# Patient Record
Sex: MALE | Race: OTHER | NOT HISPANIC OR LATINO
[De-identification: names, ages, dates, MRNs, and addresses within clinical notes are randomized per-mention and may not be internally consistent; named-entity substitution may affect disease eponyms.]

---

## 2017-03-03 ENCOUNTER — APPOINTMENT (OUTPATIENT)
Dept: INTERNAL MEDICINE | Facility: CLINIC | Age: 62
End: 2017-03-03

## 2017-03-07 ENCOUNTER — APPOINTMENT (OUTPATIENT)
Dept: OPHTHALMOLOGY | Facility: CLINIC | Age: 62
End: 2017-03-07

## 2017-03-07 DIAGNOSIS — H11.153 PINGUECULA, BILATERAL: ICD-10-CM

## 2017-03-07 DIAGNOSIS — H26.9 UNSPECIFIED CATARACT: ICD-10-CM

## 2017-03-10 ENCOUNTER — APPOINTMENT (OUTPATIENT)
Dept: INTERNAL MEDICINE | Facility: CLINIC | Age: 62
End: 2017-03-10

## 2017-03-17 ENCOUNTER — LABORATORY RESULT (OUTPATIENT)
Age: 62
End: 2017-03-17

## 2017-03-17 ENCOUNTER — OUTPATIENT (OUTPATIENT)
Dept: OUTPATIENT SERVICES | Facility: HOSPITAL | Age: 62
LOS: 1 days | End: 2017-03-17
Payer: COMMERCIAL

## 2017-03-17 ENCOUNTER — APPOINTMENT (OUTPATIENT)
Dept: INTERNAL MEDICINE | Facility: CLINIC | Age: 62
End: 2017-03-17

## 2017-03-17 VITALS
BODY MASS INDEX: 23.73 KG/M2 | HEIGHT: 64 IN | WEIGHT: 139 LBS | HEART RATE: 60 BPM | DIASTOLIC BLOOD PRESSURE: 60 MMHG | SYSTOLIC BLOOD PRESSURE: 111 MMHG

## 2017-03-17 DIAGNOSIS — I10 ESSENTIAL (PRIMARY) HYPERTENSION: ICD-10-CM

## 2017-03-17 PROCEDURE — 82043 UR ALBUMIN QUANTITATIVE: CPT

## 2017-03-17 PROCEDURE — 80053 COMPREHEN METABOLIC PANEL: CPT

## 2017-03-17 PROCEDURE — 80061 LIPID PANEL: CPT

## 2017-03-17 PROCEDURE — 83036 HEMOGLOBIN GLYCOSYLATED A1C: CPT

## 2017-03-17 PROCEDURE — G0463: CPT

## 2017-03-18 LAB
ALBUMIN SERPL ELPH-MCNC: 4.7 G/DL — SIGNIFICANT CHANGE UP (ref 3.3–5)
ALP SERPL-CCNC: 58 U/L — SIGNIFICANT CHANGE UP (ref 40–120)
ALT FLD-CCNC: 49 U/L — HIGH (ref 10–45)
ANION GAP SERPL CALC-SCNC: 14 MMOL/L — SIGNIFICANT CHANGE UP (ref 5–17)
AST SERPL-CCNC: 32 U/L — SIGNIFICANT CHANGE UP (ref 10–40)
BILIRUB SERPL-MCNC: 1.6 MG/DL — HIGH (ref 0.2–1.2)
BUN SERPL-MCNC: 13 MG/DL — SIGNIFICANT CHANGE UP (ref 7–23)
CALCIUM SERPL-MCNC: 9.9 MG/DL — SIGNIFICANT CHANGE UP (ref 8.4–10.5)
CHLORIDE SERPL-SCNC: 100 MMOL/L — SIGNIFICANT CHANGE UP (ref 96–108)
CHOLEST SERPL-MCNC: 214 MG/DL — HIGH (ref 10–199)
CO2 SERPL-SCNC: 25 MMOL/L — SIGNIFICANT CHANGE UP (ref 22–31)
CREAT ?TM UR-MCNC: 175 MG/DL — SIGNIFICANT CHANGE UP
CREAT SERPL-MCNC: 0.95 MG/DL — SIGNIFICANT CHANGE UP (ref 0.5–1.3)
GLUCOSE SERPL-MCNC: 150 MG/DL — HIGH (ref 70–99)
HBA1C BLD-MCNC: 7.9 % — HIGH (ref 4–5.6)
HDLC SERPL-MCNC: 47 MG/DL — SIGNIFICANT CHANGE UP (ref 40–125)
LIPID PNL WITH DIRECT LDL SERPL: 126 MG/DL — SIGNIFICANT CHANGE UP
MICROALBUMIN UR-MCNC: 0.6 MG/DL — SIGNIFICANT CHANGE UP
MICROALBUMIN/CREAT UR-RTO: 3 UG/MG — SIGNIFICANT CHANGE UP (ref 0–30)
POTASSIUM SERPL-MCNC: 4.9 MMOL/L — SIGNIFICANT CHANGE UP (ref 3.5–5.3)
POTASSIUM SERPL-SCNC: 4.9 MMOL/L — SIGNIFICANT CHANGE UP (ref 3.5–5.3)
PROT SERPL-MCNC: 7.5 G/DL — SIGNIFICANT CHANGE UP (ref 6–8.3)
SODIUM SERPL-SCNC: 139 MMOL/L — SIGNIFICANT CHANGE UP (ref 135–145)
TOTAL CHOLESTEROL/HDL RATIO MEASUREMENT: 4.6 RATIO — SIGNIFICANT CHANGE UP (ref 3.4–9.6)
TRIGL SERPL-MCNC: 203 MG/DL — HIGH (ref 10–149)

## 2017-03-20 DIAGNOSIS — E11.9 TYPE 2 DIABETES MELLITUS WITHOUT COMPLICATIONS: ICD-10-CM

## 2017-03-20 DIAGNOSIS — Z00.00 ENCOUNTER FOR GENERAL ADULT MEDICAL EXAMINATION WITHOUT ABNORMAL FINDINGS: ICD-10-CM

## 2017-03-20 DIAGNOSIS — E80.6 OTHER DISORDERS OF BILIRUBIN METABOLISM: ICD-10-CM

## 2017-04-27 PROBLEM — H11.153 PINGUECULA, BILATERAL: Status: RESOLVED | Noted: 2017-03-07 | Resolved: 2017-04-27

## 2017-04-27 PROBLEM — H26.9 CATARACTS, BILATERAL: Status: RESOLVED | Noted: 2017-03-07 | Resolved: 2017-04-27

## 2017-10-11 ENCOUNTER — APPOINTMENT (OUTPATIENT)
Dept: OPHTHALMOLOGY | Facility: CLINIC | Age: 62
End: 2017-10-11
Payer: MEDICAID

## 2017-10-11 PROCEDURE — 92012 INTRM OPH EXAM EST PATIENT: CPT

## 2017-10-20 ENCOUNTER — RESULT CHARGE (OUTPATIENT)
Age: 62
End: 2017-10-20

## 2017-10-20 ENCOUNTER — OUTPATIENT (OUTPATIENT)
Dept: OUTPATIENT SERVICES | Facility: HOSPITAL | Age: 62
LOS: 1 days | End: 2017-10-20
Payer: COMMERCIAL

## 2017-10-20 ENCOUNTER — APPOINTMENT (OUTPATIENT)
Dept: INTERNAL MEDICINE | Facility: CLINIC | Age: 62
End: 2017-10-20

## 2017-10-20 ENCOUNTER — LABORATORY RESULT (OUTPATIENT)
Age: 62
End: 2017-10-20

## 2017-10-20 VITALS
BODY MASS INDEX: 23.56 KG/M2 | HEIGHT: 64 IN | DIASTOLIC BLOOD PRESSURE: 80 MMHG | SYSTOLIC BLOOD PRESSURE: 100 MMHG | OXYGEN SATURATION: 98 % | WEIGHT: 138 LBS | HEART RATE: 60 BPM

## 2017-10-20 DIAGNOSIS — M25.519 PAIN IN UNSPECIFIED SHOULDER: ICD-10-CM

## 2017-10-20 DIAGNOSIS — I10 ESSENTIAL (PRIMARY) HYPERTENSION: ICD-10-CM

## 2017-10-20 LAB
ALBUMIN SERPL ELPH-MCNC: 4.8 G/DL — SIGNIFICANT CHANGE UP (ref 3.3–5)
ALP SERPL-CCNC: 59 U/L — SIGNIFICANT CHANGE UP (ref 40–120)
ALT FLD-CCNC: 49 U/L — HIGH (ref 10–45)
ANION GAP SERPL CALC-SCNC: 16 MMOL/L — SIGNIFICANT CHANGE UP (ref 5–17)
AST SERPL-CCNC: 28 U/L — SIGNIFICANT CHANGE UP (ref 10–40)
BILIRUB SERPL-MCNC: 1.4 MG/DL — HIGH (ref 0.2–1.2)
BUN SERPL-MCNC: 12 MG/DL — SIGNIFICANT CHANGE UP (ref 7–23)
CALCIUM SERPL-MCNC: 10.3 MG/DL — SIGNIFICANT CHANGE UP (ref 8.4–10.5)
CHLORIDE SERPL-SCNC: 98 MMOL/L — SIGNIFICANT CHANGE UP (ref 96–108)
CHOLEST SERPL-MCNC: 210 MG/DL — HIGH (ref 10–199)
CO2 SERPL-SCNC: 25 MMOL/L — SIGNIFICANT CHANGE UP (ref 22–31)
CREAT SERPL-MCNC: 0.98 MG/DL — SIGNIFICANT CHANGE UP (ref 0.5–1.3)
GLUCOSE SERPL-MCNC: 115 MG/DL — HIGH (ref 70–99)
HDLC SERPL-MCNC: 50 MG/DL — SIGNIFICANT CHANGE UP (ref 40–125)
LIPID PNL WITH DIRECT LDL SERPL: 120 MG/DL — SIGNIFICANT CHANGE UP
POTASSIUM SERPL-MCNC: 5.2 MMOL/L — SIGNIFICANT CHANGE UP (ref 3.5–5.3)
POTASSIUM SERPL-SCNC: 5.2 MMOL/L — SIGNIFICANT CHANGE UP (ref 3.5–5.3)
PROT SERPL-MCNC: 7.8 G/DL — SIGNIFICANT CHANGE UP (ref 6–8.3)
SODIUM SERPL-SCNC: 139 MMOL/L — SIGNIFICANT CHANGE UP (ref 135–145)
T4 FREE SERPL-MCNC: 1.4 NG/DL — SIGNIFICANT CHANGE UP (ref 0.9–1.8)
TOTAL CHOLESTEROL/HDL RATIO MEASUREMENT: 4.2 RATIO — SIGNIFICANT CHANGE UP (ref 3.4–9.6)
TRIGL SERPL-MCNC: 198 MG/DL — HIGH (ref 10–149)
TSH SERPL-MCNC: 2.08 UIU/ML — SIGNIFICANT CHANGE UP (ref 0.27–4.2)

## 2017-10-20 PROCEDURE — 84439 ASSAY OF FREE THYROXINE: CPT

## 2017-10-20 PROCEDURE — 84443 ASSAY THYROID STIM HORMONE: CPT

## 2017-10-20 PROCEDURE — 90688 IIV4 VACCINE SPLT 0.5 ML IM: CPT

## 2017-10-20 PROCEDURE — 83036 HEMOGLOBIN GLYCOSYLATED A1C: CPT

## 2017-10-20 PROCEDURE — 80053 COMPREHEN METABOLIC PANEL: CPT

## 2017-10-20 PROCEDURE — 80061 LIPID PANEL: CPT

## 2017-10-20 PROCEDURE — G0008: CPT

## 2017-10-20 PROCEDURE — G0463: CPT

## 2017-10-24 DIAGNOSIS — E80.6 OTHER DISORDERS OF BILIRUBIN METABOLISM: ICD-10-CM

## 2017-10-24 DIAGNOSIS — E78.5 HYPERLIPIDEMIA, UNSPECIFIED: ICD-10-CM

## 2017-10-24 DIAGNOSIS — Z23 ENCOUNTER FOR IMMUNIZATION: ICD-10-CM

## 2017-10-24 DIAGNOSIS — E11.9 TYPE 2 DIABETES MELLITUS WITHOUT COMPLICATIONS: ICD-10-CM

## 2017-10-27 LAB — HBA1C MFR BLD HPLC: 6.8

## 2018-05-04 ENCOUNTER — APPOINTMENT (OUTPATIENT)
Dept: INTERNAL MEDICINE | Facility: CLINIC | Age: 63
End: 2018-05-04
Payer: MEDICAID

## 2018-05-04 ENCOUNTER — OUTPATIENT (OUTPATIENT)
Dept: OUTPATIENT SERVICES | Facility: HOSPITAL | Age: 63
LOS: 1 days | End: 2018-05-04
Payer: COMMERCIAL

## 2018-05-04 ENCOUNTER — LABORATORY RESULT (OUTPATIENT)
Age: 63
End: 2018-05-04

## 2018-05-04 VITALS
SYSTOLIC BLOOD PRESSURE: 124 MMHG | WEIGHT: 135 LBS | HEIGHT: 64 IN | BODY MASS INDEX: 23.05 KG/M2 | DIASTOLIC BLOOD PRESSURE: 78 MMHG | OXYGEN SATURATION: 98 % | HEART RATE: 68 BPM

## 2018-05-04 DIAGNOSIS — I10 ESSENTIAL (PRIMARY) HYPERTENSION: ICD-10-CM

## 2018-05-04 LAB
ALBUMIN SERPL ELPH-MCNC: 4.5 G/DL — SIGNIFICANT CHANGE UP (ref 3.3–5)
ALBUMIN: 30
ALP SERPL-CCNC: 52 U/L — SIGNIFICANT CHANGE UP (ref 40–120)
ALT FLD-CCNC: 45 U/L — SIGNIFICANT CHANGE UP (ref 10–45)
ANION GAP SERPL CALC-SCNC: 14 MMOL/L — SIGNIFICANT CHANGE UP (ref 5–17)
AST SERPL-CCNC: 27 U/L — SIGNIFICANT CHANGE UP (ref 10–40)
BILIRUB DIRECT SERPL-MCNC: 0.3 MG/DL — HIGH (ref 0–0.2)
BILIRUB SERPL-MCNC: 1.4 MG/DL — HIGH (ref 0.2–1.2)
BUN SERPL-MCNC: 13 MG/DL — SIGNIFICANT CHANGE UP (ref 7–23)
CALCIUM SERPL-MCNC: 9.6 MG/DL — SIGNIFICANT CHANGE UP (ref 8.4–10.5)
CHLORIDE SERPL-SCNC: 104 MMOL/L — SIGNIFICANT CHANGE UP (ref 96–108)
CHOLEST SERPL-MCNC: 197 MG/DL — SIGNIFICANT CHANGE UP (ref 10–199)
CO2 SERPL-SCNC: 24 MMOL/L — SIGNIFICANT CHANGE UP (ref 22–31)
CREAT SERPL-MCNC: 1.04 MG/DL — SIGNIFICANT CHANGE UP (ref 0.5–1.3)
CREATININE: 300
GLUCOSE SERPL-MCNC: 132 MG/DL — HIGH (ref 70–99)
HBA1C BLD-MCNC: 8.2 % — HIGH (ref 4–5.6)
HBA1C MFR BLD HPLC: 8.1
HCT VFR BLD CALC: 47.4 % — SIGNIFICANT CHANGE UP (ref 39–50)
HDLC SERPL-MCNC: 45 MG/DL — SIGNIFICANT CHANGE UP (ref 40–125)
HGB BLD-MCNC: 15.8 G/DL — SIGNIFICANT CHANGE UP (ref 13–17)
LIPID PNL WITH DIRECT LDL SERPL: 113 MG/DL — SIGNIFICANT CHANGE UP
MCHC RBC-ENTMCNC: 29.8 PG — SIGNIFICANT CHANGE UP (ref 27–34)
MCHC RBC-ENTMCNC: 33.3 GM/DL — SIGNIFICANT CHANGE UP (ref 32–36)
MCV RBC AUTO: 89.3 FL — SIGNIFICANT CHANGE UP (ref 80–100)
MICROALBUMIN/CREAT UR TEST STR-RTO: <30
PLATELET # BLD AUTO: 231 K/UL — SIGNIFICANT CHANGE UP (ref 150–400)
POTASSIUM SERPL-MCNC: 4.9 MMOL/L — SIGNIFICANT CHANGE UP (ref 3.5–5.3)
POTASSIUM SERPL-SCNC: 4.9 MMOL/L — SIGNIFICANT CHANGE UP (ref 3.5–5.3)
PROT SERPL-MCNC: 7.3 G/DL — SIGNIFICANT CHANGE UP (ref 6–8.3)
RBC # BLD: 5.31 M/UL — SIGNIFICANT CHANGE UP (ref 4.2–5.8)
RBC # FLD: 13.5 % — SIGNIFICANT CHANGE UP (ref 10.3–14.5)
SODIUM SERPL-SCNC: 142 MMOL/L — SIGNIFICANT CHANGE UP (ref 135–145)
TOTAL CHOLESTEROL/HDL RATIO MEASUREMENT: 4.4 RATIO — SIGNIFICANT CHANGE UP (ref 3.4–9.6)
TRIGL SERPL-MCNC: 197 MG/DL — HIGH (ref 10–149)
WBC # BLD: 6.52 K/UL — SIGNIFICANT CHANGE UP (ref 3.8–10.5)
WBC # FLD AUTO: 6.52 K/UL — SIGNIFICANT CHANGE UP (ref 3.8–10.5)

## 2018-05-04 PROCEDURE — 83036 HEMOGLOBIN GLYCOSYLATED A1C: CPT

## 2018-05-04 PROCEDURE — 80053 COMPREHEN METABOLIC PANEL: CPT

## 2018-05-04 PROCEDURE — 82248 BILIRUBIN DIRECT: CPT

## 2018-05-04 PROCEDURE — 85027 COMPLETE CBC AUTOMATED: CPT

## 2018-05-04 PROCEDURE — 99213 OFFICE O/P EST LOW 20 MIN: CPT | Mod: GE

## 2018-05-04 PROCEDURE — G0463: CPT

## 2018-05-04 PROCEDURE — 80061 LIPID PANEL: CPT

## 2018-05-11 DIAGNOSIS — E80.6 OTHER DISORDERS OF BILIRUBIN METABOLISM: ICD-10-CM

## 2018-05-11 DIAGNOSIS — E78.5 HYPERLIPIDEMIA, UNSPECIFIED: ICD-10-CM

## 2018-05-11 DIAGNOSIS — Z12.11 ENCOUNTER FOR SCREENING FOR MALIGNANT NEOPLASM OF COLON: ICD-10-CM

## 2018-05-11 DIAGNOSIS — E11.9 TYPE 2 DIABETES MELLITUS WITHOUT COMPLICATIONS: ICD-10-CM

## 2019-01-04 ENCOUNTER — APPOINTMENT (OUTPATIENT)
Dept: OPHTHALMOLOGY | Facility: CLINIC | Age: 64
End: 2019-01-04
Payer: MEDICAID

## 2019-01-04 ENCOUNTER — APPOINTMENT (OUTPATIENT)
Dept: INTERNAL MEDICINE | Facility: CLINIC | Age: 64
End: 2019-01-04
Payer: MEDICAID

## 2019-01-04 ENCOUNTER — OUTPATIENT (OUTPATIENT)
Dept: OUTPATIENT SERVICES | Facility: HOSPITAL | Age: 64
LOS: 1 days | End: 2019-01-04
Payer: COMMERCIAL

## 2019-01-04 ENCOUNTER — RESULT REVIEW (OUTPATIENT)
Age: 64
End: 2019-01-04

## 2019-01-04 VITALS
OXYGEN SATURATION: 97 % | SYSTOLIC BLOOD PRESSURE: 110 MMHG | WEIGHT: 132 LBS | HEIGHT: 64 IN | DIASTOLIC BLOOD PRESSURE: 70 MMHG | BODY MASS INDEX: 22.53 KG/M2 | HEART RATE: 60 BPM

## 2019-01-04 DIAGNOSIS — I10 ESSENTIAL (PRIMARY) HYPERTENSION: ICD-10-CM

## 2019-01-04 LAB
BILIRUB UR QL STRIP: NORMAL
CLARITY UR: CLEAR
COLLECTION METHOD: NORMAL
GLUCOSE UR-MCNC: NORMAL
HBA1C MFR BLD HPLC: 6.5
HCG UR QL: 0.2 EU/DL
HGB UR QL STRIP.AUTO: NORMAL
KETONES UR-MCNC: NORMAL
LEUKOCYTE ESTERASE UR QL STRIP: NORMAL
NITRITE UR QL STRIP: NORMAL
PH UR STRIP: 5.5
PROT UR STRIP-MCNC: NORMAL
SP GR UR STRIP: 1.03

## 2019-01-04 PROCEDURE — 92012 INTRM OPH EXAM EST PATIENT: CPT

## 2019-01-04 PROCEDURE — 99213 OFFICE O/P EST LOW 20 MIN: CPT | Mod: GE

## 2019-01-04 NOTE — PHYSICAL EXAM
[No Acute Distress] : no acute distress [Well Nourished] : well nourished [Well-Appearing] : well-appearing [No JVD] : no jugular venous distention [Supple] : supple [Clear to Auscultation] : lungs were clear to auscultation bilaterally [Normal Rate] : normal rate  [Regular Rhythm] : with a regular rhythm [Normal S1, S2] : normal S1 and S2 [No Murmur] : no murmur heard [No Edema] : there was no peripheral edema [Soft] : abdomen soft [Non-distended] : non-distended [No HSM] : no HSM [Normal Bowel Sounds] : normal bowel sounds [No CVA Tenderness] : no CVA  tenderness [No Spinal Tenderness] : no spinal tenderness [de-identified] : no wheezes, rales or rhonchi  [de-identified] : LLQ pain worse with palpation. No evidence of peritoneal signs, no tenderness in Mcburney's area.  [de-identified] : No tenderness, warmth, fluctuance or asymmetry on palpation of the scrotum. No evidence of hernia on exam b/l.

## 2019-01-04 NOTE — HISTORY OF PRESENT ILLNESS
[de-identified] : 62 year old male with hx of T2DM, ED, HLD who presents for follow up. Patient was last seen last year found to have worsening A1c but was loss to follow up. \par \par Patient presents with reports of pain in the left lower abdominal pain/groin pain that started a couple of months. Comes on intermittently worsened when he works. Not worse with bearing down. Throbbing in nature 10/10 pain couple of seconds in nature and spontaneously. Denies any fevers, changes in bowel or urinary habits. Denies any hematuria, melena or BRBPR. Worse with palpation.

## 2019-01-04 NOTE — ASSESSMENT
[FreeTextEntry1] : 63 year old male with hx of T2DM, HLD who presents for left lower quadrants pain \par \par # LLQ pain \par - POCT UA: no hematuria and not significant for infection\par - could be subacute divertilculitis (no new diarrheal signs or infectious symptoms) or could be related to a kidney stone \par - will obtain CT A/P \par - instructed to hydrate appropriately, and use tylenol 975 BID PRN for pain \par - if patient develops worsening pain, diarrheal symptoms or fevers - - would return as suspicion for diverticulitis would be higher \par \par # DM\par - POCT A1c 6.5<-- 8.2 (much improved from prior visit) with metformin\par - c/w metformin as per usual regimen\par - will add on CMP to blood tests to assess kidney function\par \par # HCM\par - schedule for CPE \par - Flu shot administered at [facility] \par \par RTC for CPE \par \par Case discussed with Dr. Zamora

## 2019-01-04 NOTE — REVIEW OF SYSTEMS
[Abdominal Pain] : abdominal pain [Fever] : no fever [Chills] : no chills [Night Sweats] : no night sweats [Chest Pain] : no chest pain [Palpitations] : no palpitations [Shortness Of Breath] : no shortness of breath [Nausea] : no nausea [Constipation] : no constipation [Diarrhea] : no diarrhea [Vomiting] : no vomiting [Heartburn] : no heartburn [Melena] : no melena [Dysuria] : no dysuria [Incontinence] : no incontinence [Hesitancy] : no hesitancy [Hematuria] : no hematuria [Frequency] : no frequency [Back Pain] : no back pain

## 2019-01-07 ENCOUNTER — LABORATORY RESULT (OUTPATIENT)
Age: 64
End: 2019-01-07

## 2019-01-07 LAB
BASOPHILS # BLD AUTO: 0.01 K/UL — SIGNIFICANT CHANGE UP (ref 0–0.2)
BASOPHILS NFR BLD AUTO: 0.2 % — SIGNIFICANT CHANGE UP (ref 0–2)
EOSINOPHIL # BLD AUTO: 0.18 K/UL — SIGNIFICANT CHANGE UP (ref 0–0.5)
EOSINOPHIL NFR BLD AUTO: 3 % — SIGNIFICANT CHANGE UP (ref 0–6)
HCT VFR BLD CALC: 48.7 % — SIGNIFICANT CHANGE UP (ref 39–50)
HGB BLD-MCNC: 16.4 G/DL — SIGNIFICANT CHANGE UP (ref 13–17)
IMM GRANULOCYTES NFR BLD AUTO: 0.3 % — SIGNIFICANT CHANGE UP (ref 0–1.5)
LYMPHOCYTES # BLD AUTO: 2.09 K/UL — SIGNIFICANT CHANGE UP (ref 1–3.3)
LYMPHOCYTES # BLD AUTO: 34.9 % — SIGNIFICANT CHANGE UP (ref 13–44)
MCHC RBC-ENTMCNC: 29.5 PG — SIGNIFICANT CHANGE UP (ref 27–34)
MCHC RBC-ENTMCNC: 33.7 GM/DL — SIGNIFICANT CHANGE UP (ref 32–36)
MCV RBC AUTO: 87.6 FL — SIGNIFICANT CHANGE UP (ref 80–100)
MONOCYTES # BLD AUTO: 0.77 K/UL — SIGNIFICANT CHANGE UP (ref 0–0.9)
MONOCYTES NFR BLD AUTO: 12.9 % — SIGNIFICANT CHANGE UP (ref 2–14)
NEUTROPHILS # BLD AUTO: 2.92 K/UL — SIGNIFICANT CHANGE UP (ref 1.8–7.4)
NEUTROPHILS NFR BLD AUTO: 48.7 % — SIGNIFICANT CHANGE UP (ref 43–77)
PLATELET # BLD AUTO: 219 K/UL — SIGNIFICANT CHANGE UP (ref 150–400)
RBC # BLD: 5.56 M/UL — SIGNIFICANT CHANGE UP (ref 4.2–5.8)
RBC # FLD: 13.4 % — SIGNIFICANT CHANGE UP (ref 10.3–14.5)
WBC # BLD: 5.99 K/UL — SIGNIFICANT CHANGE UP (ref 3.8–10.5)
WBC # FLD AUTO: 5.99 K/UL — SIGNIFICANT CHANGE UP (ref 3.8–10.5)

## 2019-01-07 PROCEDURE — 80053 COMPREHEN METABOLIC PANEL: CPT

## 2019-01-07 PROCEDURE — 80061 LIPID PANEL: CPT

## 2019-01-07 PROCEDURE — G0463: CPT

## 2019-01-07 PROCEDURE — 85027 COMPLETE CBC AUTOMATED: CPT

## 2019-01-08 LAB
ALBUMIN SERPL ELPH-MCNC: 4.5 G/DL — SIGNIFICANT CHANGE UP (ref 3.3–5)
ALP SERPL-CCNC: 49 U/L — SIGNIFICANT CHANGE UP (ref 40–120)
ALT FLD-CCNC: 42 U/L — SIGNIFICANT CHANGE UP (ref 10–45)
ANION GAP SERPL CALC-SCNC: 10 MMOL/L — SIGNIFICANT CHANGE UP (ref 5–17)
AST SERPL-CCNC: 28 U/L — SIGNIFICANT CHANGE UP (ref 10–40)
BILIRUB SERPL-MCNC: 1.4 MG/DL — HIGH (ref 0.2–1.2)
BUN SERPL-MCNC: 14 MG/DL — SIGNIFICANT CHANGE UP (ref 7–23)
CALCIUM SERPL-MCNC: 10.1 MG/DL — SIGNIFICANT CHANGE UP (ref 8.4–10.5)
CHLORIDE SERPL-SCNC: 102 MMOL/L — SIGNIFICANT CHANGE UP (ref 96–108)
CHOLEST SERPL-MCNC: 205 MG/DL — HIGH (ref 10–199)
CO2 SERPL-SCNC: 26 MMOL/L — SIGNIFICANT CHANGE UP (ref 22–31)
CREAT SERPL-MCNC: 0.99 MG/DL — SIGNIFICANT CHANGE UP (ref 0.5–1.3)
GLUCOSE SERPL-MCNC: 121 MG/DL — HIGH (ref 70–99)
HDLC SERPL-MCNC: 40 MG/DL — SIGNIFICANT CHANGE UP
LIPID PNL WITH DIRECT LDL SERPL: 121 MG/DL — SIGNIFICANT CHANGE UP
POTASSIUM SERPL-MCNC: 5.1 MMOL/L — SIGNIFICANT CHANGE UP (ref 3.5–5.3)
POTASSIUM SERPL-SCNC: 5.1 MMOL/L — SIGNIFICANT CHANGE UP (ref 3.5–5.3)
PROT SERPL-MCNC: 7.6 G/DL — SIGNIFICANT CHANGE UP (ref 6–8.3)
SODIUM SERPL-SCNC: 138 MMOL/L — SIGNIFICANT CHANGE UP (ref 135–145)
TOTAL CHOLESTEROL/HDL RATIO MEASUREMENT: 5.1 RATIO — SIGNIFICANT CHANGE UP (ref 3.4–9.6)
TRIGL SERPL-MCNC: 222 MG/DL — HIGH (ref 10–149)

## 2019-01-09 ENCOUNTER — APPOINTMENT (OUTPATIENT)
Dept: CT IMAGING | Facility: IMAGING CENTER | Age: 64
End: 2019-01-09

## 2019-01-14 DIAGNOSIS — E11.9 TYPE 2 DIABETES MELLITUS WITHOUT COMPLICATIONS: ICD-10-CM

## 2019-01-14 DIAGNOSIS — R10.32 LEFT LOWER QUADRANT PAIN: ICD-10-CM

## 2019-01-18 ENCOUNTER — APPOINTMENT (OUTPATIENT)
Dept: CT IMAGING | Facility: IMAGING CENTER | Age: 64
End: 2019-01-18

## 2019-02-04 ENCOUNTER — FORM ENCOUNTER (OUTPATIENT)
Age: 64
End: 2019-02-04

## 2019-02-05 ENCOUNTER — OUTPATIENT (OUTPATIENT)
Dept: OUTPATIENT SERVICES | Facility: HOSPITAL | Age: 64
LOS: 1 days | End: 2019-02-05
Payer: COMMERCIAL

## 2019-02-05 ENCOUNTER — APPOINTMENT (OUTPATIENT)
Dept: CT IMAGING | Facility: IMAGING CENTER | Age: 64
End: 2019-02-05
Payer: MEDICAID

## 2019-02-05 DIAGNOSIS — R10.32 LEFT LOWER QUADRANT PAIN: ICD-10-CM

## 2019-02-05 PROCEDURE — 74176 CT ABD & PELVIS W/O CONTRAST: CPT

## 2019-02-05 PROCEDURE — 74176 CT ABD & PELVIS W/O CONTRAST: CPT | Mod: 26

## 2019-02-26 ENCOUNTER — APPOINTMENT (OUTPATIENT)
Dept: GASTROENTEROLOGY | Facility: HOSPITAL | Age: 64
End: 2019-02-26
Payer: MEDICAID

## 2019-02-26 ENCOUNTER — OUTPATIENT (OUTPATIENT)
Dept: OUTPATIENT SERVICES | Facility: HOSPITAL | Age: 64
LOS: 1 days | End: 2019-02-26
Payer: COMMERCIAL

## 2019-02-26 VITALS
BODY MASS INDEX: 22.88 KG/M2 | DIASTOLIC BLOOD PRESSURE: 86 MMHG | HEART RATE: 60 BPM | WEIGHT: 134 LBS | SYSTOLIC BLOOD PRESSURE: 132 MMHG | HEIGHT: 64 IN

## 2019-02-26 DIAGNOSIS — R10.9 UNSPECIFIED ABDOMINAL PAIN: ICD-10-CM

## 2019-02-26 LAB
ALBUMIN SERPL ELPH-MCNC: 4.9 G/DL — SIGNIFICANT CHANGE UP (ref 3.3–5)
ALP SERPL-CCNC: 60 U/L — SIGNIFICANT CHANGE UP (ref 40–120)
ALT FLD-CCNC: 42 U/L — SIGNIFICANT CHANGE UP (ref 10–45)
ANION GAP SERPL CALC-SCNC: 13 MMOL/L — SIGNIFICANT CHANGE UP (ref 5–17)
AST SERPL-CCNC: 30 U/L — SIGNIFICANT CHANGE UP (ref 10–40)
BASOPHILS # BLD AUTO: 0.05 K/UL — SIGNIFICANT CHANGE UP (ref 0–0.2)
BASOPHILS NFR BLD AUTO: 0.7 % — SIGNIFICANT CHANGE UP (ref 0–2)
BILIRUB SERPL-MCNC: 1.2 MG/DL — SIGNIFICANT CHANGE UP (ref 0.2–1.2)
BUN SERPL-MCNC: 15 MG/DL — SIGNIFICANT CHANGE UP (ref 7–23)
CALCIUM SERPL-MCNC: 10.4 MG/DL — SIGNIFICANT CHANGE UP (ref 8.4–10.5)
CHLORIDE SERPL-SCNC: 100 MMOL/L — SIGNIFICANT CHANGE UP (ref 96–108)
CO2 SERPL-SCNC: 25 MMOL/L — SIGNIFICANT CHANGE UP (ref 22–31)
CREAT SERPL-MCNC: 0.98 MG/DL — SIGNIFICANT CHANGE UP (ref 0.5–1.3)
EOSINOPHIL # BLD AUTO: 0.21 K/UL — SIGNIFICANT CHANGE UP (ref 0–0.5)
EOSINOPHIL NFR BLD AUTO: 2.8 % — SIGNIFICANT CHANGE UP (ref 0–6)
GLUCOSE SERPL-MCNC: 93 MG/DL — SIGNIFICANT CHANGE UP (ref 70–99)
HCT VFR BLD CALC: 45.8 % — SIGNIFICANT CHANGE UP (ref 39–50)
HGB BLD-MCNC: 15.6 G/DL — SIGNIFICANT CHANGE UP (ref 13–17)
IMM GRANULOCYTES NFR BLD AUTO: 0.4 % — SIGNIFICANT CHANGE UP (ref 0–1.5)
INR BLD: 1.03 RATIO — SIGNIFICANT CHANGE UP (ref 0.88–1.16)
LYMPHOCYTES # BLD AUTO: 3.37 K/UL — HIGH (ref 1–3.3)
LYMPHOCYTES # BLD AUTO: 44.2 % — HIGH (ref 13–44)
MCHC RBC-ENTMCNC: 29.2 PG — SIGNIFICANT CHANGE UP (ref 27–34)
MCHC RBC-ENTMCNC: 34.1 GM/DL — SIGNIFICANT CHANGE UP (ref 32–36)
MCV RBC AUTO: 85.6 FL — SIGNIFICANT CHANGE UP (ref 80–100)
MONOCYTES # BLD AUTO: 1.03 K/UL — HIGH (ref 0–0.9)
MONOCYTES NFR BLD AUTO: 13.5 % — SIGNIFICANT CHANGE UP (ref 2–14)
NEUTROPHILS # BLD AUTO: 2.93 K/UL — SIGNIFICANT CHANGE UP (ref 1.8–7.4)
NEUTROPHILS NFR BLD AUTO: 38.4 % — LOW (ref 43–77)
PLATELET # BLD AUTO: 245 K/UL — SIGNIFICANT CHANGE UP (ref 150–400)
POTASSIUM SERPL-MCNC: 4.9 MMOL/L — SIGNIFICANT CHANGE UP (ref 3.5–5.3)
POTASSIUM SERPL-SCNC: 4.9 MMOL/L — SIGNIFICANT CHANGE UP (ref 3.5–5.3)
PROT SERPL-MCNC: 7.7 G/DL — SIGNIFICANT CHANGE UP (ref 6–8.3)
PROTHROM AB SERPL-ACNC: 11.8 SEC — SIGNIFICANT CHANGE UP (ref 10–13.1)
RBC # BLD: 5.35 M/UL — SIGNIFICANT CHANGE UP (ref 4.2–5.8)
RBC # FLD: 13.1 % — SIGNIFICANT CHANGE UP (ref 10.3–14.5)
SODIUM SERPL-SCNC: 138 MMOL/L — SIGNIFICANT CHANGE UP (ref 135–145)
WBC # BLD: 7.62 K/UL — SIGNIFICANT CHANGE UP (ref 3.8–10.5)
WBC # FLD AUTO: 7.62 K/UL — SIGNIFICANT CHANGE UP (ref 3.8–10.5)

## 2019-02-26 PROCEDURE — 80053 COMPREHEN METABOLIC PANEL: CPT

## 2019-02-26 PROCEDURE — 99202 OFFICE O/P NEW SF 15 MIN: CPT | Mod: GC

## 2019-02-26 PROCEDURE — 85610 PROTHROMBIN TIME: CPT

## 2019-02-26 PROCEDURE — G0463: CPT

## 2019-02-27 NOTE — PHYSICAL EXAM
[General Appearance - Alert] : alert [General Appearance - In No Acute Distress] : in no acute distress [Sclera] : the sclera and conjunctiva were normal [PERRL With Normal Accommodation] : pupils were equal in size, round, and reactive to light [Examination Of The Oral Cavity] : the lips and gums were normal [Oropharynx] : the oropharynx was normal [Neck Appearance] : the appearance of the neck was normal [Respiration, Rhythm And Depth] : normal respiratory rhythm and effort [Exaggerated Use Of Accessory Muscles For Inspiration] : no accessory muscle use [Heart Rate And Rhythm] : heart rate was normal and rhythm regular [Heart Sounds] : normal S1 and S2 [Bowel Sounds] : normal bowel sounds [Abdomen Soft] : soft [Abdomen Tenderness] : non-tender [Abnormal Walk] : normal gait [Musculoskeletal - Swelling] : no joint swelling seen [] : no rash [Skin Lesions] : no skin lesions [Motor Exam] : the motor exam was normal [No Focal Deficits] : no focal deficits [FreeTextEntry1] : Left inguinal hernia, reducible

## 2019-02-27 NOTE — HISTORY OF PRESENT ILLNESS
[Heartburn] : denies heartburn [Nausea] : denies nausea [Vomiting] : denies vomiting [Diarrhea] : denies diarrhea [Constipation] : denies constipation [Yellow Skin Or Eyes (Jaundice)] : denies jaundice [Abdominal Pain] : denies abdominal pain [Abdominal Swelling] : denies abdominal swelling [Rectal Pain] : denies rectal pain [_________] : Performed [unfilled] [Wt Loss ___ Lbs] : no recent weight loss [de-identified] : N/A [de-identified] : N/A [de-identified] : N/A [de-identified] : 62 year old male with a history of type 2 diabetes mellitus, hyperlipidemia, and erectile dysfunction, referred from primary care clinic for evaluation for colorectal cancer screening with colonoscopy.\par \par The patient states he underwent colonoscopy in 2009 or 2010 at [facility]. Colonoscopy records are not readily available on [facility]. The patient states his colonoscopy was "normal," and that he was recommended to have a repeat colonoscopy in 10 years. He denies family history of colorectal malignancy.\par \par The patient denies diarrhea, constipation, bloody stools, and black tarry stools. He denies changes in his stool caliber and stool frequency. Mr. Valentin denies night sweats and weight loss. He denies nausea/vomiting. He endorses left lower groin abdominal pain and was recently found to have an inguinal hernia on CT A/P. [de-identified] : \par EXAM:  CT ABDOMEN AND PELVIS OC  \par \par \par PROCEDURE DATE:  02/05/2019  \par  \par \par \par INTERPRETATION:  CLINICAL INFORMATION: Abdominal pain. \par \par COMPARISON: None.\par \par PROCEDURE: \par CT of the Abdomen and Pelvis was performed without intravenous contrast. \par Intravenous contrast: None.\par Oral contrast: positive contrast was administered.\par Sagittal and coronal reformats were performed.\par \par FINDINGS:\par \par LOWER CHEST: Within normal limits. \par \par LIVER: Hepatic steatosis.\par BILE DUCTS: Normal caliber.\par GALLBLADDER: Within normal limits.\par SPLEEN: Within normal limits. \par PANCREAS: Within normal limits.\par ADRENALS: Within normal limits. \par KIDNEYS/URETERS: Within normal limits.     \par \par BLADDER: Within normal limits. \par REPRODUCTIVE ORGANS: The prostate is within normal limits.\par \par BOWEL: No bowel obstruction. Nonvisualized appendix.             \par PERITONEUM: No ascites.     \par VESSELS:  Within normal limits.\par RETROPERITONEUM: No lymphadenopathy.   \par ABDOMINAL WALL: Small right inguinal hernia containing portion of the \par urinary bladder.\par BONES: Within normal limits. \par \par IMPRESSION: No acute abnormality in the abdomen or pelvis. \par \par Small right inguinal hernia containing portion of the bladder.\par \par Hepatic steatosis.\par \par \par \par \par \par \par \par  \par \par \par ELISE COLBERT M.D., ATTENDING RADIOLOGIST \par This document has been electronically signed. Feb 5 2019  3:42PM\par   \par \par   \par

## 2019-02-27 NOTE — ASSESSMENT
[FreeTextEntry1] : Impression:\par \par 1. Colorectal cancer screening: Patient is average risk for colorectal malignancy. He has no alarm symptoms. \par 2. Abdominal pain: Likely due to inguinal hernia. Patient has appointment with General Surgery next week.\par 3. Hepatic steatosis: Noted on recent CT A/P. Likely developed in the setting of diabetes mellitus.\par \par Recommendations:\par - Check CBC, CMP, and PT/INR\par - Plan for colonoscopy pending clearance from general surgery.  Risks and benefits of endoscopic procedure, including: bleeding, perforation and reaction to sedation were discussed with patient who understands and is agreeable to proceed.  Our office will schedule pt for the procedure.\par \par - GoLYTELY and bisacodyl for preparation\par - The patient was counseled on the importance of diet, exercise, and weight loss.

## 2019-02-28 DIAGNOSIS — Z12.11 ENCOUNTER FOR SCREENING FOR MALIGNANT NEOPLASM OF COLON: ICD-10-CM

## 2019-02-28 DIAGNOSIS — K76.0 FATTY (CHANGE OF) LIVER, NOT ELSEWHERE CLASSIFIED: ICD-10-CM

## 2019-02-28 DIAGNOSIS — K40.90 UNILATERAL INGUINAL HERNIA, WITHOUT OBSTRUCTION OR GANGRENE, NOT SPECIFIED AS RECURRENT: ICD-10-CM

## 2019-03-04 ENCOUNTER — APPOINTMENT (OUTPATIENT)
Dept: SURGERY | Facility: HOSPITAL | Age: 64
End: 2019-03-04

## 2019-03-07 ENCOUNTER — OUTPATIENT (OUTPATIENT)
Dept: OUTPATIENT SERVICES | Facility: HOSPITAL | Age: 64
LOS: 1 days | End: 2019-03-07
Payer: COMMERCIAL

## 2019-03-07 VITALS
SYSTOLIC BLOOD PRESSURE: 113 MMHG | TEMPERATURE: 98 F | WEIGHT: 138.01 LBS | HEART RATE: 66 BPM | HEIGHT: 64 IN | RESPIRATION RATE: 12 BRPM | DIASTOLIC BLOOD PRESSURE: 67 MMHG

## 2019-03-07 DIAGNOSIS — Z12.11 ENCOUNTER FOR SCREENING FOR MALIGNANT NEOPLASM OF COLON: ICD-10-CM

## 2019-03-07 PROCEDURE — 45378 DIAGNOSTIC COLONOSCOPY: CPT | Mod: GC

## 2019-03-07 PROCEDURE — G0121: CPT

## 2019-03-07 PROCEDURE — 82962 GLUCOSE BLOOD TEST: CPT

## 2019-03-18 ENCOUNTER — APPOINTMENT (OUTPATIENT)
Dept: SURGERY | Facility: HOSPITAL | Age: 64
End: 2019-03-18

## 2019-03-18 ENCOUNTER — OUTPATIENT (OUTPATIENT)
Dept: OUTPATIENT SERVICES | Facility: HOSPITAL | Age: 64
LOS: 1 days | End: 2019-03-18
Payer: COMMERCIAL

## 2019-03-18 VITALS
WEIGHT: 133 LBS | BODY MASS INDEX: 22.71 KG/M2 | DIASTOLIC BLOOD PRESSURE: 83 MMHG | RESPIRATION RATE: 14 BRPM | HEIGHT: 64 IN | SYSTOLIC BLOOD PRESSURE: 122 MMHG | HEART RATE: 62 BPM

## 2019-03-18 DIAGNOSIS — L72.3 SEBACEOUS CYST: ICD-10-CM

## 2019-03-18 DIAGNOSIS — R10.32 LEFT LOWER QUADRANT PAIN: ICD-10-CM

## 2019-03-18 PROCEDURE — G0463: CPT

## 2019-03-18 RX ORDER — BISACODYL 5 MG/1
5 TABLET ORAL
Qty: 4 | Refills: 0 | Status: COMPLETED | COMMUNITY
Start: 2019-02-26 | End: 2019-03-18

## 2019-03-18 RX ORDER — POLYETHYLENE GLYCOL 3350 AND ELECTROLYTES WITH LEMON FLAVOR 236; 22.74; 6.74; 5.86; 2.97 G/4L; G/4L; G/4L; G/4L; G/4L
236 POWDER, FOR SOLUTION ORAL
Qty: 1 | Refills: 0 | Status: COMPLETED | COMMUNITY
Start: 2019-02-26 | End: 2019-03-18

## 2019-03-19 NOTE — HISTORY OF PRESENT ILLNESS
[de-identified] : 63 year old male with DM II, presenting for evaluation of small R inguinal hernia. Patient reports that he had nonspecific pain in the lower L quadrant, aching and dull in nature, up to 6-7 out of 10, occurring intermittently since December. Patient states he had some vague discomfort prior to that, but since December his symptoms have been more frequent and more severe. Notes that his pain is worse when he is working, better when resting. Taking tylenol improves his pain but he does not want to continue taking medication constantly. Patient saw his PMD for further assessment of the symptoms and was sent for a CT scan. He has never had obstructive symptoms, does not notice any enlargement or swelling of the R sided hernia, and otherwise feels well.\par \par Of note, he is due for colonoscopy, recent colonoscopy was aborted due to inadequate prep.

## 2019-03-19 NOTE — PHYSICAL EXAM
[Normal Breath Sounds] : Normal breath sounds [Respiratory Effort] : normal respiratory effort [Normal Rate and Rhythm] : normal rate and rhythm [No Rash or Lesion] : No rash or lesion [Alert] : alert [Oriented to Person] : oriented to person [Oriented to Place] : oriented to place [Oriented to Time] : oriented to time [Calm] : calm [Abdominal Masses] : No abdominal masses [Abdomen Tenderness] : ~T ~M No abdominal tenderness [de-identified] : well nourished, NAD [de-identified] : anicteric sclera, EOMI [de-identified] : patient indicates LLQ pain, just inferior to umbilicus as source of pain, no palpable masses; R groin with no appreciable swelling or tenderness

## 2019-03-19 NOTE — REVIEW OF SYSTEMS
[Abdominal Pain] : abdominal pain [Fever] : no fever [Chills] : no chills [Feeling Poorly] : not feeling poorly [Feeling Tired] : not feeling tired [Recent Weight Gain (___ Lbs)] : no recent weight gain [Recent Weight Loss (___ Lbs)] : no recent weight loss [Loss Of Hearing] : no hearing loss [Eyesight Problems] : no eyesight problems [Heart Rate Is Fast] : the heart rate was not fast [Chest Pain] : no chest pain [Shortness Of Breath] : no shortness of breath [Palpitations] : no palpitations [Cough] : no cough [Vomiting] : no vomiting [Constipation] : no constipation [Diarrhea] : no diarrhea [Heartburn] : no heartburn [Joint Pain] : no joint pain [Dysuria] : no dysuria [Incontinence] : no incontinence [Limb Pain] : no limb pain [Dizziness] : no dizziness [Itching] : no itching [Feelings Of Weakness] : no feelings of weakness [Fainting] : no fainting [de-identified] : No Rashes

## 2019-03-19 NOTE — PLAN
[FreeTextEntry1] : 63 year old male with CT demonstrating small RIGHT inguinal hernia, complaining of several months of aching LEFT abdominal pain; hemodynamically stable, without evidence of obstruction.\par \par - CT showed no evidence of abnormalities on left side; low clinical suspicion that R sided inguinal hernia is the underlying cause of his symptoms in setting of laterally and location of reported pain\par - may have musculoskeletal pain as his discomfort is worse with movement and activity, recommend rest and analgesia with NSAIDs as needed\par - would recommend follow up with GI for screening colonoscopy\par - may benefit from R inguinal hernia repair if symptoms develop; would not recommend hernia repair in absence of clinical symptoms\par - better imaging would include CT of abdomen and pelvis with IV contrast\par - return to clinic in 4 weeks for reassessment of symptoms following recommendations of decreased lifting and straining\par \par Seen and examined with Dr. Mohamud,\par --NATE Mast, PGY-4

## 2019-03-20 DIAGNOSIS — K40.90 UNILATERAL INGUINAL HERNIA, WITHOUT OBSTRUCTION OR GANGRENE, NOT SPECIFIED AS RECURRENT: ICD-10-CM

## 2019-03-20 DIAGNOSIS — R10.32 LEFT LOWER QUADRANT PAIN: ICD-10-CM

## 2019-04-15 ENCOUNTER — OUTPATIENT (OUTPATIENT)
Dept: OUTPATIENT SERVICES | Facility: HOSPITAL | Age: 64
LOS: 1 days | End: 2019-04-15
Payer: COMMERCIAL

## 2019-04-15 ENCOUNTER — APPOINTMENT (OUTPATIENT)
Dept: SURGERY | Facility: CLINIC | Age: 64
End: 2019-04-15
Payer: MEDICAID

## 2019-04-15 ENCOUNTER — APPOINTMENT (OUTPATIENT)
Dept: SURGERY | Facility: HOSPITAL | Age: 64
End: 2019-04-15
Payer: MEDICAID

## 2019-04-15 VITALS
SYSTOLIC BLOOD PRESSURE: 115 MMHG | BODY MASS INDEX: 23.56 KG/M2 | OXYGEN SATURATION: 98 % | HEART RATE: 59 BPM | WEIGHT: 138 LBS | TEMPERATURE: 97.8 F | HEIGHT: 64 IN | DIASTOLIC BLOOD PRESSURE: 74 MMHG

## 2019-04-15 DIAGNOSIS — K40.90 UNILATERAL INGUINAL HERNIA, W/OUT OBSTRUCTION OR GANGRENE, NOT SPECIFIED AS RECURRENT: ICD-10-CM

## 2019-04-15 DIAGNOSIS — L72.3 SEBACEOUS CYST: ICD-10-CM

## 2019-04-15 PROCEDURE — 99203 OFFICE O/P NEW LOW 30 MIN: CPT

## 2019-04-15 PROCEDURE — 99212 OFFICE O/P EST SF 10 MIN: CPT

## 2019-04-15 PROCEDURE — G0463: CPT

## 2019-04-15 NOTE — PLAN
[FreeTextEntry1] : - I spoke at length with the patient regarding the findings on physical exam showing bilateral inguinal hernias\par - I offered the patient robotic-assisted, laparoscopic bilateral inguinal hernia repair with mesh, possible open\par - At this time the patient is agreeable to undergoing this procedure, and after discussing the risks, benefits, and alternatives of the procedure gave consent\par - All questions were answered\par - Planning for surgery on 5/10/2019

## 2019-04-15 NOTE — HISTORY OF PRESENT ILLNESS
[de-identified] : 63M with history of diabetes, who presents with 5 month history of left and right inguinal pain associated with heavy lifting. Patient states that the pain is worse with lifting, and is affecting his ability to work (he works as an ). Otherwise he denies obstructive symptoms, and denies fever/chills, is tolerating diet well. He was seen by Dr. Foley at [place] and found to have bilateral inguinal hernias on exam, and also has a CT abdomen/pelvis showing that the right inguinal hernia has bladder herniating through it.

## 2019-04-15 NOTE — PHYSICAL EXAM
[JVD] : no jugular venous distention  [Respiratory Effort] : normal respiratory effort [Normal Rate and Rhythm] : normal rate and rhythm [Alert] : alert [Oriented to Person] : oriented to person [Oriented to Place] : oriented to place [Oriented to Time] : oriented to time [Calm] : calm [de-identified] : NCAT [de-identified] : NAD [de-identified] : Well-healed RLQ incision site, non-distended, soft, non-TTP in all quadrants [de-identified] : Right and left inguinal hernia defects (Right > Left), testes descended B/L

## 2019-04-15 NOTE — HISTORY OF PRESENT ILLNESS
[de-identified] : 63M w/ hx of DMII, distant hx of appendectomy when pt was 9yo comes in today for follow up for persistent LLQ pain. Pain started in December, worse with working and heavy lifting, tylenol/ibuprofen provide minimal relief. CT scan c/w RIH. Denies any obstructive symptoms, tolerates diet w/o N/V, GI fxn at baseline, no dysuria. Denies fevers/chills, N/V.  [de-identified] : Since last visit reduced heavy lifting and was taking Tylenol and ibuprofen w/ minimal relief.

## 2019-04-15 NOTE — PLAN
[FreeTextEntry1] : - Pt will benefit from a laparoscopic bilateral hernia repair. Will refer to Dr. Boo \par - F/u with GI for colonoscopy. Cleared by surgery. \par - C/w Tylenol/ Ibuprofen for pain\par - Avoid heavy lifting

## 2019-04-15 NOTE — ADDENDUM
[FreeTextEntry1] : last seen in the clinic on 3/18/2019 for LLQ / left groin pain.\par \par exam reveals\par McBurney's incision (appendectomy 1965 in country of [place])\par bilateral small reducible inguinal hernias\par \par -referred to Jayce Boo for laparoscopic bilateral inguinal hernia repairs. appointment made for today @ 1500.\par \par -no contraindication for routine screening colonoscopy

## 2019-04-15 NOTE — ASSESSMENT
[FreeTextEntry1] : 63M w/ hx of DMII, distant hx of appendectomy when pt was 9yo comes in today for follow up for persistent LLQ pain. CT abd c/w RIH. Physical exam c/w bilateral inguinal hernia, given that pt has a significant LLQ pain surgical repair is recommended. \par \par

## 2019-04-16 DIAGNOSIS — K40.90 UNILATERAL INGUINAL HERNIA, WITHOUT OBSTRUCTION OR GANGRENE, NOT SPECIFIED AS RECURRENT: ICD-10-CM

## 2019-04-26 ENCOUNTER — OUTPATIENT (OUTPATIENT)
Dept: OUTPATIENT SERVICES | Facility: HOSPITAL | Age: 64
LOS: 1 days | Discharge: HOME | End: 2019-04-26
Payer: MEDICAID

## 2019-04-26 VITALS
SYSTOLIC BLOOD PRESSURE: 129 MMHG | OXYGEN SATURATION: 98 % | DIASTOLIC BLOOD PRESSURE: 81 MMHG | HEIGHT: 63 IN | WEIGHT: 136.47 LBS | HEART RATE: 65 BPM | RESPIRATION RATE: 18 BRPM | TEMPERATURE: 98 F

## 2019-04-26 DIAGNOSIS — K40.20 BILATERAL INGUINAL HERNIA, WITHOUT OBSTRUCTION OR GANGRENE, NOT SPECIFIED AS RECURRENT: ICD-10-CM

## 2019-04-26 DIAGNOSIS — Z01.818 ENCOUNTER FOR OTHER PREPROCEDURAL EXAMINATION: ICD-10-CM

## 2019-04-26 DIAGNOSIS — Z90.49 ACQUIRED ABSENCE OF OTHER SPECIFIED PARTS OF DIGESTIVE TRACT: Chronic | ICD-10-CM

## 2019-04-26 DIAGNOSIS — E11.9 TYPE 2 DIABETES MELLITUS WITHOUT COMPLICATIONS: ICD-10-CM

## 2019-04-26 DIAGNOSIS — Z01.812 ENCOUNTER FOR PREPROCEDURAL LABORATORY EXAMINATION: ICD-10-CM

## 2019-04-26 LAB
ANION GAP SERPL CALC-SCNC: 7 MMOL/L — SIGNIFICANT CHANGE UP (ref 5–17)
APTT BLD: 35.5 SEC — SIGNIFICANT CHANGE UP (ref 28.5–37)
BASOPHILS # BLD AUTO: 0.04 K/UL — SIGNIFICANT CHANGE UP (ref 0–0.2)
BASOPHILS NFR BLD AUTO: 0.6 % — SIGNIFICANT CHANGE UP (ref 0–2)
BUN SERPL-MCNC: 15 MG/DL — SIGNIFICANT CHANGE UP (ref 7–23)
CALCIUM SERPL-MCNC: 9.7 MG/DL — SIGNIFICANT CHANGE UP (ref 8.5–10.1)
CHLORIDE SERPL-SCNC: 103 MMOL/L — SIGNIFICANT CHANGE UP (ref 96–108)
CO2 SERPL-SCNC: 29 MMOL/L — SIGNIFICANT CHANGE UP (ref 22–31)
CREAT SERPL-MCNC: 0.89 MG/DL — SIGNIFICANT CHANGE UP (ref 0.5–1.3)
EOSINOPHIL # BLD AUTO: 0.37 K/UL — SIGNIFICANT CHANGE UP (ref 0–0.5)
EOSINOPHIL NFR BLD AUTO: 5.3 % — SIGNIFICANT CHANGE UP (ref 0–6)
GLUCOSE SERPL-MCNC: 127 MG/DL — HIGH (ref 70–99)
HCT VFR BLD CALC: 47.7 % — SIGNIFICANT CHANGE UP (ref 39–50)
HGB BLD-MCNC: 16.2 G/DL — SIGNIFICANT CHANGE UP (ref 13–17)
IMM GRANULOCYTES NFR BLD AUTO: 0.3 % — SIGNIFICANT CHANGE UP (ref 0–1.5)
INR BLD: 1.06 RATIO — SIGNIFICANT CHANGE UP (ref 0.88–1.16)
LYMPHOCYTES # BLD AUTO: 2.06 K/UL — SIGNIFICANT CHANGE UP (ref 1–3.3)
LYMPHOCYTES # BLD AUTO: 29.6 % — SIGNIFICANT CHANGE UP (ref 13–44)
MCHC RBC-ENTMCNC: 29.1 PG — SIGNIFICANT CHANGE UP (ref 27–34)
MCHC RBC-ENTMCNC: 34 GM/DL — SIGNIFICANT CHANGE UP (ref 32–36)
MCV RBC AUTO: 85.6 FL — SIGNIFICANT CHANGE UP (ref 80–100)
MONOCYTES # BLD AUTO: 0.94 K/UL — HIGH (ref 0–0.9)
MONOCYTES NFR BLD AUTO: 13.5 % — SIGNIFICANT CHANGE UP (ref 2–14)
NEUTROPHILS # BLD AUTO: 3.54 K/UL — SIGNIFICANT CHANGE UP (ref 1.8–7.4)
NEUTROPHILS NFR BLD AUTO: 50.7 % — SIGNIFICANT CHANGE UP (ref 43–77)
NRBC # BLD: 0 /100 WBCS — SIGNIFICANT CHANGE UP (ref 0–0)
PLATELET # BLD AUTO: 280 K/UL — SIGNIFICANT CHANGE UP (ref 150–400)
POTASSIUM SERPL-MCNC: 4.3 MMOL/L — SIGNIFICANT CHANGE UP (ref 3.5–5.3)
POTASSIUM SERPL-SCNC: 4.3 MMOL/L — SIGNIFICANT CHANGE UP (ref 3.5–5.3)
PROTHROM AB SERPL-ACNC: 11.9 SEC — SIGNIFICANT CHANGE UP (ref 10–12.9)
RBC # BLD: 5.57 M/UL — SIGNIFICANT CHANGE UP (ref 4.2–5.8)
RBC # FLD: 13 % — SIGNIFICANT CHANGE UP (ref 10.3–14.5)
SODIUM SERPL-SCNC: 139 MMOL/L — SIGNIFICANT CHANGE UP (ref 135–145)
WBC # BLD: 6.97 K/UL — SIGNIFICANT CHANGE UP (ref 3.8–10.5)
WBC # FLD AUTO: 6.97 K/UL — SIGNIFICANT CHANGE UP (ref 3.8–10.5)

## 2019-04-26 PROCEDURE — 93010 ELECTROCARDIOGRAM REPORT: CPT

## 2019-04-26 NOTE — H&P PST ADULT - HISTORY OF PRESENT ILLNESS
63M pmh DM c/o left inguinal pain, on imaging found to have b/l inguinal hernia here for PST for scheduled ------- 63M pmh DM c/o left inguinal pain, on CT imaging found to have b/l inguinal hernia here for PST for scheduled Laparoscopic robotic-assisted bilateral inguinal hernia repair

## 2019-04-26 NOTE — H&P PST ADULT - ATTENDING COMMENTS
I spoke with the patient regarding the risks, benefits, and alternatives of the planned robotic-assisted laparoscopic bilateral inguinal hernia repair with mesh, possible open. The patient gave consent for the procedure and all questions were answered.

## 2019-04-26 NOTE — H&P PST ADULT - NSICDXPROBLEM_GEN_ALL_CORE_FT
PROBLEM DIAGNOSES  Problem: Bilateral inguinal hernia, without obstruction or gangrene, not specified as recurrent  Assessment and Plan: laparoscopic robotic-assisted bilateral inguinal hernia repair   Pre-op instructions given by RN, patient verbalized understanding  Chlorhexidine wash instructions given     Problem: DM (diabetes mellitus)  Assessment and Plan: Continue current regimen and medications.

## 2019-04-26 NOTE — H&P PST ADULT - ASSESSMENT
63M pmh DM c/o left inguinal pain, on CT imaging found to have b/l inguinal hernia here for PST for scheduled Laparoscopic robotic-assisted bilateral inguinal hernia repair   CAPRINI SCORE    AGE RELATED RISK FACTORS                                                       MOBILITY RELATED FACTORS  [ ] Age 41-60 years                                            (1 Point)                  [ ] Bed rest                                                        (1 Point)  [x ] Age: 61-74 years                                           (2 Points)                [ ] Plaster cast                                                   (2 Points)  [ ] Age= 75 years                                              (3 Points)                 [ ] Bed bound for more than 72 hours                   (2 Points)    DISEASE RELATED RISK FACTORS                                               GENDER SPECIFIC FACTORS  [ ] Edema in the lower extremities                       (1 Point)                  [ ] Pregnancy                                                     (1 Point)  [ ] Varicose veins                                               (1 Point)                  [ ] Post-partum < 6 weeks                                   (1 Point)             [ ] BMI > 25 Kg/m2                                            (1 Point)                  [ ] Hormonal therapy  or oral contraception            (1 Point)                 [ ] Sepsis (in the previous month)                        (1 Point)                  [ ] History of pregnancy complications  [ ] Pneumonia or serious lung disease                                               [ ] Unexplained or recurrent                       (1 Point)           (in the previous month)                               (1 Point)  [ ] Abnormal pulmonary function test                     (1 Point)                 SURGERY RELATED RISK FACTORS  [ ] Acute myocardial infarction                              (1 Point)                 [ ]  Section                                            (1 Point)  [ ] Congestive heart failure (in the previous month)  (1 Point)                 [ ] Minor surgery                                                 (1 Point)   [ ] Inflammatory bowel disease                             (1 Point)                 [ ] Arthroscopic surgery                                        (2 Points)  [ ] Central venous access                                    (2 Points)                [x ] General surgery lasting more than 45 minutes   (2 Points)       [ ] Stroke (in the previous month)                          (5 Points)               [ ] Elective arthroplasty                                        (5 Points)                                                                                                                                               HEMATOLOGY RELATED FACTORS                                                 TRAUMA RELATED RISK FACTORS  [ ] Prior episodes of VTE                                     (3 Points)                 [ ] Fracture of the hip, pelvis, or leg                       (5 Points)  [ ] Positive family history for VTE                         (3 Points)                 [ ] Acute spinal cord injury (in the previous month)  (5 Points)  [ ] Prothrombin 03079 A                                      (3 Points)                 [ ] Paralysis  (less than 1 month)                          (5 Points)  [ ] Factor V Leiden                                             (3 Points)                 [ ] Multiple Trauma within 1 month                         (5 Points)  [ ] Lupus anticoagulants                                     (3 Points)                                                           [ ] Anticardiolipin antibodies                                (3 Points)                                                       [ ] High homocysteine in the blood                      (3 Points)                                             [ ] Other congenital or acquired thrombophilia       (3 Points)                                                [ ] Heparin induced thrombocytopenia                  (3 Points)                                          Total Score [      4    ]

## 2019-05-06 ENCOUNTER — OUTPATIENT (OUTPATIENT)
Dept: OUTPATIENT SERVICES | Facility: HOSPITAL | Age: 64
LOS: 1 days | End: 2019-05-06
Payer: COMMERCIAL

## 2019-05-06 ENCOUNTER — APPOINTMENT (OUTPATIENT)
Dept: INTERNAL MEDICINE | Facility: CLINIC | Age: 64
End: 2019-05-06
Payer: MEDICAID

## 2019-05-06 VITALS
OXYGEN SATURATION: 98 % | HEART RATE: 87 BPM | SYSTOLIC BLOOD PRESSURE: 118 MMHG | BODY MASS INDEX: 23.22 KG/M2 | DIASTOLIC BLOOD PRESSURE: 76 MMHG | WEIGHT: 136 LBS | HEIGHT: 64 IN

## 2019-05-06 DIAGNOSIS — I10 ESSENTIAL (PRIMARY) HYPERTENSION: ICD-10-CM

## 2019-05-06 DIAGNOSIS — R10.32 LEFT LOWER QUADRANT PAIN: ICD-10-CM

## 2019-05-06 DIAGNOSIS — Z90.49 ACQUIRED ABSENCE OF OTHER SPECIFIED PARTS OF DIGESTIVE TRACT: Chronic | ICD-10-CM

## 2019-05-06 PROBLEM — E11.9 TYPE 2 DIABETES MELLITUS WITHOUT COMPLICATIONS: Chronic | Status: ACTIVE | Noted: 2019-04-26

## 2019-05-06 PROCEDURE — G0463: CPT

## 2019-05-06 PROCEDURE — 99213 OFFICE O/P EST LOW 20 MIN: CPT | Mod: GE

## 2019-05-06 NOTE — PHYSICAL EXAM
[No Acute Distress] : no acute distress [Well Nourished] : well nourished [Normal Sclera/Conjunctiva] : normal sclera/conjunctiva [Well-Appearing] : well-appearing [Well Developed] : well developed [EOMI] : extraocular movements intact [PERRL] : pupils equal round and reactive to light [Normal Outer Ear/Nose] : the outer ears and nose were normal in appearance [Normal Oropharynx] : the oropharynx was normal [No JVD] : no jugular venous distention [Supple] : supple [Normal Rate] : normal rate  [Clear to Auscultation] : lungs were clear to auscultation bilaterally [No Respiratory Distress] : no respiratory distress  [Normal S1, S2] : normal S1 and S2 [No Murmur] : no murmur heard [Regular Rhythm] : with a regular rhythm [Non Tender] : non-tender [Soft] : abdomen soft [No CVA Tenderness] : no CVA  tenderness [No Spinal Tenderness] : no spinal tenderness [Non-distended] : non-distended

## 2019-05-09 ENCOUNTER — TRANSCRIPTION ENCOUNTER (OUTPATIENT)
Age: 64
End: 2019-05-09

## 2019-05-10 ENCOUNTER — OUTPATIENT (OUTPATIENT)
Dept: OUTPATIENT SERVICES | Facility: HOSPITAL | Age: 64
LOS: 1 days | Discharge: HOME | End: 2019-05-10
Payer: MEDICAID

## 2019-05-10 VITALS
HEART RATE: 66 BPM | OXYGEN SATURATION: 95 % | RESPIRATION RATE: 20 BRPM | SYSTOLIC BLOOD PRESSURE: 108 MMHG | DIASTOLIC BLOOD PRESSURE: 64 MMHG

## 2019-05-10 VITALS
RESPIRATION RATE: 18 BRPM | SYSTOLIC BLOOD PRESSURE: 116 MMHG | WEIGHT: 138.01 LBS | TEMPERATURE: 97 F | HEIGHT: 64 IN | HEART RATE: 65 BPM | DIASTOLIC BLOOD PRESSURE: 79 MMHG | OXYGEN SATURATION: 99 %

## 2019-05-10 DIAGNOSIS — Z90.49 ACQUIRED ABSENCE OF OTHER SPECIFIED PARTS OF DIGESTIVE TRACT: Chronic | ICD-10-CM

## 2019-05-10 PROCEDURE — S2900 ROBOTIC SURGICAL SYSTEM: CPT

## 2019-05-10 PROCEDURE — 49651 LAP ING HERNIA REPAIR RECUR: CPT | Mod: AS

## 2019-05-10 PROCEDURE — 49650 LAP ING HERNIA REPAIR INIT: CPT | Mod: 50

## 2019-05-10 RX ORDER — ACETAMINOPHEN 500 MG
1000 TABLET ORAL ONCE
Refills: 0 | Status: COMPLETED | OUTPATIENT
Start: 2019-05-10 | End: 2019-05-10

## 2019-05-10 RX ORDER — SODIUM CHLORIDE 9 MG/ML
3 INJECTION INTRAMUSCULAR; INTRAVENOUS; SUBCUTANEOUS EVERY 8 HOURS
Refills: 0 | Status: DISCONTINUED | OUTPATIENT
Start: 2019-05-10 | End: 2019-05-10

## 2019-05-10 RX ORDER — HYDROMORPHONE HYDROCHLORIDE 2 MG/ML
0.5 INJECTION INTRAMUSCULAR; INTRAVENOUS; SUBCUTANEOUS
Refills: 0 | Status: DISCONTINUED | OUTPATIENT
Start: 2019-05-10 | End: 2019-05-10

## 2019-05-10 RX ORDER — OXYCODONE HYDROCHLORIDE 5 MG/1
1 TABLET ORAL
Qty: 12 | Refills: 0
Start: 2019-05-10

## 2019-05-10 RX ORDER — SODIUM CHLORIDE 9 MG/ML
1000 INJECTION, SOLUTION INTRAVENOUS
Refills: 0 | Status: DISCONTINUED | OUTPATIENT
Start: 2019-05-10 | End: 2019-05-10

## 2019-05-10 RX ADMIN — Medication 400 MILLIGRAM(S): at 17:21

## 2019-05-10 RX ADMIN — SODIUM CHLORIDE 50 MILLILITER(S): 9 INJECTION, SOLUTION INTRAVENOUS at 17:06

## 2019-05-10 RX ADMIN — Medication 1000 MILLIGRAM(S): at 17:35

## 2019-05-10 NOTE — BRIEF OPERATIVE NOTE - NSICDXBRIEFPROCEDURE_GEN_ALL_CORE_FT
PROCEDURES:  Robot-assisted repair of inguinal hernia using da Walker Xi 10-May-2019 17:34:32 bilateral with mesh Sally Herrera

## 2019-05-10 NOTE — ASU DISCHARGE PLAN (ADULT/PEDIATRIC) - PROCEDURE
Robotic assited Bilateral inguinal hernia repair with mesh Robotic assisted laparoscopic Bilateral inguinal hernia repair with mesh

## 2019-05-10 NOTE — ASU DISCHARGE PLAN (ADULT/PEDIATRIC) - CALL YOUR DOCTOR IF YOU HAVE ANY OF THE FOLLOWING:
Unable to urinate/Fever greater than (need to indicate Fahrenheit or Celsius)/Nausea and vomiting that does not stop/Inability to tolerate liquids or foods/Increased irritability or sluggishness/Wound/Surgical Site with redness, or foul smelling discharge or pus/Swelling that gets worse/Excessive diarrhea/Pain not relieved by Medications/Bleeding that does not stop/Numbness, tingling, color or temperature change to extremity

## 2019-05-10 NOTE — ASU DISCHARGE PLAN (ADULT/PEDIATRIC) - CARE PROVIDER_API CALL
Jayce Boo (MD)  Surgery  [address] [facility], [place] [address]  Phone: [phone]  Fax: [phone]  Follow Up Time:

## 2019-05-11 ENCOUNTER — EMERGENCY (EMERGENCY)
Facility: HOSPITAL | Age: 64
LOS: 0 days | Discharge: HOME | End: 2019-05-11
Attending: EMERGENCY MEDICINE
Payer: MEDICAID

## 2019-05-11 VITALS
OXYGEN SATURATION: 99 % | HEIGHT: 64 IN | DIASTOLIC BLOOD PRESSURE: 62 MMHG | TEMPERATURE: 99 F | RESPIRATION RATE: 16 BRPM | SYSTOLIC BLOOD PRESSURE: 128 MMHG | WEIGHT: 138.01 LBS | HEART RATE: 71 BPM

## 2019-05-11 VITALS
RESPIRATION RATE: 17 BRPM | OXYGEN SATURATION: 97 % | HEART RATE: 81 BPM | DIASTOLIC BLOOD PRESSURE: 68 MMHG | TEMPERATURE: 98 F | SYSTOLIC BLOOD PRESSURE: 116 MMHG

## 2019-05-11 DIAGNOSIS — R33.9 RETENTION OF URINE, UNSPECIFIED: ICD-10-CM

## 2019-05-11 DIAGNOSIS — Z90.49 ACQUIRED ABSENCE OF OTHER SPECIFIED PARTS OF DIGESTIVE TRACT: Chronic | ICD-10-CM

## 2019-05-11 DIAGNOSIS — Z91.013 ALLERGY TO SEAFOOD: ICD-10-CM

## 2019-05-11 DIAGNOSIS — E11.9 TYPE 2 DIABETES MELLITUS WITHOUT COMPLICATIONS: ICD-10-CM

## 2019-05-11 DIAGNOSIS — Z79.899 OTHER LONG TERM (CURRENT) DRUG THERAPY: ICD-10-CM

## 2019-05-11 DIAGNOSIS — Z79.84 LONG TERM (CURRENT) USE OF ORAL HYPOGLYCEMIC DRUGS: ICD-10-CM

## 2019-05-11 LAB
APPEARANCE UR: CLEAR — SIGNIFICANT CHANGE UP
BILIRUB UR-MCNC: NEGATIVE — SIGNIFICANT CHANGE UP
COLOR SPEC: YELLOW — SIGNIFICANT CHANGE UP
DIFF PNL FLD: NEGATIVE — SIGNIFICANT CHANGE UP
GLUCOSE UR QL: 250 MG/DL
KETONES UR-MCNC: NEGATIVE — SIGNIFICANT CHANGE UP
LEUKOCYTE ESTERASE UR-ACNC: NEGATIVE — SIGNIFICANT CHANGE UP
NITRITE UR-MCNC: NEGATIVE — SIGNIFICANT CHANGE UP
PH UR: 5 — SIGNIFICANT CHANGE UP (ref 5–8)
PROT UR-MCNC: NEGATIVE MG/DL — SIGNIFICANT CHANGE UP
SP GR SPEC: 1.01 — SIGNIFICANT CHANGE UP (ref 1.01–1.02)
UROBILINOGEN FLD QL: NEGATIVE MG/DL — SIGNIFICANT CHANGE UP

## 2019-05-11 PROCEDURE — 99283 EMERGENCY DEPT VISIT LOW MDM: CPT

## 2019-05-11 RX ORDER — ASPIRIN/CALCIUM CARB/MAGNESIUM 324 MG
81 TABLET ORAL
Qty: 0 | Refills: 0 | DISCHARGE

## 2019-05-11 RX ORDER — METFORMIN HYDROCHLORIDE 850 MG/1
1 TABLET ORAL
Qty: 0 | Refills: 0 | DISCHARGE

## 2019-05-11 RX ORDER — TAMSULOSIN HYDROCHLORIDE 0.4 MG/1
1 CAPSULE ORAL
Qty: 14 | Refills: 0
Start: 2019-05-11 | End: 2019-05-24

## 2019-05-11 RX ORDER — CHOLECALCIFEROL (VITAMIN D3) 125 MCG
1 CAPSULE ORAL
Qty: 0 | Refills: 0 | DISCHARGE

## 2019-05-11 NOTE — ED ADULT NURSE NOTE - OBJECTIVE STATEMENT
Patient s/p hernia repair yesterday by Dr. Boo.  C/o abdominal discomfort that started last night with urinary retention.  PMH DM

## 2019-05-11 NOTE — ED ADULT NURSE NOTE - NSIMPLEMENTINTERV_GEN_ALL_ED
Implemented All Universal Safety Interventions:  [place] to call system. Call bell, personal items and telephone within reach. Instruct patient to call for assistance. Room bathroom lighting operational. Non-slip footwear when patient is off stretcher. Physically safe environment: no spills, clutter or unnecessary equipment. Stretcher in lowest position, wheels locked, appropriate side rails in place.

## 2019-05-11 NOTE — ED ADULT TRIAGE NOTE - CHIEF COMPLAINT QUOTE
Difficulty urinating since yesterday s/p laparoscopic abdominal  hernia repair yesterday, states he did not passed urine after surgery.

## 2019-05-11 NOTE — ED PROVIDER NOTE - OBJECTIVE STATEMENT
63 year old male with DM II otherwise on metformin - had lap repair of left inguinal hernia x 1 day - complaining of urinary retention, had not been able to pee since getting home last night. Had rosa in yesterday and then this AM has not been able to pee since last night. Denies any fever/chills no back pain.

## 2019-05-11 NOTE — ED PROVIDER NOTE - CLINICAL SUMMARY MEDICAL DECISION MAKING FREE TEXT BOX
pt with urinary retention s/p surgery - otherwise well appearing - rosa placed with relief, discussed case with Dr. Abreu - states may see him on Monday morning- may dc with flomax.

## 2019-05-12 LAB
CULTURE RESULTS: NO GROWTH — SIGNIFICANT CHANGE UP
SPECIMEN SOURCE: SIGNIFICANT CHANGE UP

## 2019-05-12 NOTE — REVIEW OF SYSTEMS
[Fever] : no fever [Chills] : no chills [Night Sweats] : no night sweats [Discharge] : no discharge [Vision Problems] : no vision problems [Earache] : no earache [Hearing Loss] : no hearing loss [Chest Pain] : no chest pain [Palpitations] : no palpitations [Claudication] : no  leg claudication [Lower Ext Edema] : no lower extremity edema [Orthopena] : no orthopnea [Paroysmal Nocturnal Dyspnea] : no paroysmal nocturnal dyspnea [Shortness Of Breath] : no shortness of breath [Wheezing] : no wheezing [Cough] : no cough [Dyspnea on Exertion] : not dyspnea on exertion [Abdominal Pain] : no abdominal pain [Nausea] : no nausea [Constipation] : no constipation [Diarrhea] : no diarrhea [Vomiting] : no vomiting [Dysuria] : no dysuria [Incontinence] : no incontinence [Hematuria] : no hematuria [Joint Pain] : no joint pain [Frequency] : no frequency [Back Pain] : no back pain

## 2019-05-12 NOTE — ASSESSMENT
[Patient Optimized for Surgery] : Patient optimized for surgery [Modify anticoagulant treatment prior to procedure] : Modify anticoagulant treatment prior to procedure [No Further Testing Recommended] : no further testing recommended [Modify medications prior to procedure] : Modify medications prior to procedure [Ischemic Heart Disease] : Ischemic Heart Disease - No (0) [High Risk Surgery - Intraperitoneal, Intrathoracic or Supringuinal Vascular Procedures] : High Risk Surgery - Intraperitoneal, Intrathoracic or Supringuinal Vascular Procedures - No (0) [Congestive Heart Failure] : Congestive Heart Failure - No (0) [Prior Cerebrovascular Accident or TIA] : Prior Cerebrovascular Accident or TIA - No (0) [Creatinine >= 2mg/dL (1 Point)] : Creatinine >= 2mg/dL - No (0) [Insulin-dependent Diabetic (1 Point)] : Insulin-dependent Diabetic - No (0) [0] : 0 , RCRI Class: I, Risk of Post-Op Cardiac Complications: 0.4%, Procedure Risk: Low-Risk [FreeTextEntry4] : Pt is medically optimized and has no contraindication for a low risk surgery. Pt was advised to hold metformin 1 day prior to surgery.  [FreeTextEntry5] : Hold aspirin 5 days prior to surgery  [FreeTextEntry7] : Hold metformin 1 day prior to surgery

## 2019-05-12 NOTE — HISTORY OF PRESENT ILLNESS
[No Pertinent Cardiac History] : no history of aortic stenosis, atrial fibrillation, coronary artery disease, recent myocardial infarction, or implantable device/pacemaker [No Pertinent Pulmonary History] : no history of asthma, COPD, sleep apnea, or smoking [No Adverse Anesthesia Reaction] : no adverse anesthesia reaction in self or family member [Diabetes] : diabetes [Anti-Platelet Agents: _____] : Anti-Platelet Agents: [unfilled] [Good (7-10 METs)] : Good (7-10 METs) [(Patient denies any chest pain, claudication, dyspnea on exertion, orthopnea, palpitations or syncope)] : Patient denies any chest pain, claudication, dyspnea on exertion, orthopnea, palpitations or syncope [Chronic Anticoagulation] : no chronic anticoagulation [Chronic Kidney Disease] : no chronic kidney disease [FreeTextEntry4] : 64 yo M with DMII (Last hgbA1c 6.5) presented for pre-op optimization. Pt reports that he has been in good health and has no new complaints. He denies any fever, chills, nausea, vomiting, abdominal pain, chest pain, SOB, PND, orthopnea, diarrhea, dysuria, hematuria, and LE edema. He is able to climb 3-4 flights of stairs before he become mildly SOB. His METS is >9 and RCRI is 0. His EKG showed sinus bradycardia with low voltage QRS and possible incomplete RBBB. Similar findings were seen in 2010.

## 2019-05-13 DIAGNOSIS — R10.32 LEFT LOWER QUADRANT PAIN: ICD-10-CM

## 2019-05-14 DIAGNOSIS — Z91.013 ALLERGY TO SEAFOOD: ICD-10-CM

## 2019-05-14 DIAGNOSIS — E11.9 TYPE 2 DIABETES MELLITUS WITHOUT COMPLICATIONS: ICD-10-CM

## 2019-05-14 DIAGNOSIS — Z79.82 LONG TERM (CURRENT) USE OF ASPIRIN: ICD-10-CM

## 2019-05-14 DIAGNOSIS — Z79.84 LONG TERM (CURRENT) USE OF ORAL HYPOGLYCEMIC DRUGS: ICD-10-CM

## 2019-05-14 DIAGNOSIS — K40.20 BILATERAL INGUINAL HERNIA, WITHOUT OBSTRUCTION OR GANGRENE, NOT SPECIFIED AS RECURRENT: ICD-10-CM

## 2019-05-14 DIAGNOSIS — E78.00 PURE HYPERCHOLESTEROLEMIA, UNSPECIFIED: ICD-10-CM

## 2019-05-14 DIAGNOSIS — E78.5 HYPERLIPIDEMIA, UNSPECIFIED: ICD-10-CM

## 2019-05-14 NOTE — DISCUSSION/SUMMARY
[Home] : patient was discharged to home [FreeTextEntry1] : Pt had rosa catheter placed in ED with relief (was able to urinate subsequently). \par Was also discharged with flomax 0.4 mg daily from ED.

## 2019-05-20 ENCOUNTER — APPOINTMENT (OUTPATIENT)
Dept: SURGERY | Facility: CLINIC | Age: 64
End: 2019-05-20
Payer: MEDICAID

## 2019-05-20 VITALS
SYSTOLIC BLOOD PRESSURE: 128 MMHG | HEART RATE: 65 BPM | TEMPERATURE: 98.2 F | WEIGHT: 136.38 LBS | OXYGEN SATURATION: 99 % | HEIGHT: 64 IN | DIASTOLIC BLOOD PRESSURE: 79 MMHG | BODY MASS INDEX: 23.28 KG/M2

## 2019-05-20 PROCEDURE — 99024 POSTOP FOLLOW-UP VISIT: CPT

## 2019-05-20 NOTE — PHYSICAL EXAM
[Respiratory Effort] : normal respiratory effort [de-identified] : NCAT [Normal Rate and Rhythm] : normal rate and rhythm [de-identified] : Well-healing incision sites, no erythema/drainage, no incisional hernias [de-identified] : NAD [de-identified] : Testes descended B/L without masses noted, no recurrent inguinal hernias

## 2019-05-20 NOTE — ASSESSMENT
[FreeTextEntry1] : 63M s/p robotic-assisted laparoscopic bilateral inguinal hernia repair with mesh, healing well

## 2019-05-20 NOTE — PLAN
[FreeTextEntry1] : \par - Patient advised to avoid heavy lifting for 3 weeks postoperatively\par - Patient advised to return to ED or call office should he experience fever/chills, N/V, worsening pain at the surgical sites\par - RTC PRN

## 2019-05-20 NOTE — HISTORY OF PRESENT ILLNESS
[de-identified] : 63M s/p robotic-assisted laparoscopic bilateral inguinal hernia repair with mesh on 5/10/2019, who presents for postoperative follow up. States that immediately postop he had urinary retention for which he presented to the ED and was treated with Jackson to leg-bag. Subsequently this was removed three days later and he was able to resume urination without issue. Postoperatively the pain has improved and is now well controlled. He denies fever/chills, no N/V, states that he is tolerating diet well and having normal bowel function. No other complaints at this time.

## 2019-07-09 ENCOUNTER — APPOINTMENT (OUTPATIENT)
Dept: SURGERY | Facility: CLINIC | Age: 64
End: 2019-07-09

## 2019-07-15 ENCOUNTER — APPOINTMENT (OUTPATIENT)
Dept: SURGERY | Facility: CLINIC | Age: 64
End: 2019-07-15
Payer: MEDICAID

## 2019-07-15 VITALS
HEART RATE: 69 BPM | SYSTOLIC BLOOD PRESSURE: 133 MMHG | HEIGHT: 64 IN | DIASTOLIC BLOOD PRESSURE: 83 MMHG | TEMPERATURE: 98 F | BODY MASS INDEX: 23.59 KG/M2 | OXYGEN SATURATION: 98 % | WEIGHT: 138.19 LBS

## 2019-07-15 PROCEDURE — 99024 POSTOP FOLLOW-UP VISIT: CPT

## 2019-07-15 NOTE — HISTORY OF PRESENT ILLNESS
[de-identified] : 63M s/p robotic-assisted laparoscopic bilateral inguinal hernia repair with mesh on 5/10/2019, who presents for follow up related to having intermittent episodes of pain at the right groin, worse when walking up the stairs. States it feels like a sharp pain, but quickly dissipates. He denies fever/chills, no N/V, is having normal bowel function and is tolerating diet well. Denies any other symptoms.\par \par Physical exam reveals that the incision sites are healing well with no incisional hernias noted, testes are descended B/L without any palpable masses, there are no recurrence of inguinal hernias noted.\par \par Patient with postoperative pain. I spoke with the patient regarding the physical exam findings and having a trial of Flexeril and Gabapentin with follow up in 2 weeks. The patient states he has no allergies except for shellfish. The patient is agreeable to this plan and all questtions were answered.

## 2019-08-05 PROBLEM — R10.32 LEFT LOWER QUADRANT PAIN: Status: ACTIVE | Noted: 2019-01-04

## 2019-10-01 NOTE — PRE-ANESTHESIA EVALUATION ADULT - TEMPERATURE IN CELSIUS (DEGREES C)
36.7
Detail Level: Detailed
Introduction Text (Please End With A Colon): The following procedure was deferred:

## 2019-10-01 NOTE — H&P PST ADULT - NSANTHBMIRD_ENT_A_CORE
continue finger sticks with short acting insulin sliding scale  acceptable at present  endocrinologist following No

## 2020-02-17 NOTE — ED ADULT NURSE NOTE - CAS EDN DISCHARGE INTERVENTIONS
n/a Isotretinoin Pregnancy And Lactation Text: This medication is Pregnancy Category X and is considered extremely dangerous during pregnancy. It is unknown if it is excreted in breast milk.

## 2020-08-31 ENCOUNTER — NON-APPOINTMENT (OUTPATIENT)
Age: 65
End: 2020-08-31

## 2020-08-31 ENCOUNTER — APPOINTMENT (OUTPATIENT)
Dept: OPHTHALMOLOGY | Facility: CLINIC | Age: 65
End: 2020-08-31
Payer: MEDICARE

## 2020-08-31 PROCEDURE — 92015 DETERMINE REFRACTIVE STATE: CPT

## 2020-08-31 PROCEDURE — 92012 INTRM OPH EXAM EST PATIENT: CPT

## 2020-10-30 ENCOUNTER — MED ADMIN CHARGE (OUTPATIENT)
Age: 65
End: 2020-10-30

## 2020-10-30 ENCOUNTER — OUTPATIENT (OUTPATIENT)
Dept: OUTPATIENT SERVICES | Facility: HOSPITAL | Age: 65
LOS: 1 days | End: 2020-10-30
Payer: COMMERCIAL

## 2020-10-30 ENCOUNTER — APPOINTMENT (OUTPATIENT)
Dept: INTERNAL MEDICINE | Facility: CLINIC | Age: 65
End: 2020-10-30
Payer: MEDICARE

## 2020-10-30 VITALS
DIASTOLIC BLOOD PRESSURE: 80 MMHG | WEIGHT: 136 LBS | SYSTOLIC BLOOD PRESSURE: 130 MMHG | HEIGHT: 64 IN | BODY MASS INDEX: 23.22 KG/M2

## 2020-10-30 VITALS — HEART RATE: 60 BPM

## 2020-10-30 DIAGNOSIS — Z90.49 ACQUIRED ABSENCE OF OTHER SPECIFIED PARTS OF DIGESTIVE TRACT: Chronic | ICD-10-CM

## 2020-10-30 DIAGNOSIS — I10 ESSENTIAL (PRIMARY) HYPERTENSION: ICD-10-CM

## 2020-10-30 PROCEDURE — G0009: CPT

## 2020-10-30 PROCEDURE — G0008: CPT

## 2020-10-30 PROCEDURE — 99397 PER PM REEVAL EST PAT 65+ YR: CPT | Mod: GC

## 2020-10-30 PROCEDURE — 90662 IIV NO PRSV INCREASED AG IM: CPT

## 2020-10-30 PROCEDURE — G0439: CPT | Mod: 25

## 2020-10-30 PROCEDURE — 90670 PCV13 VACCINE IM: CPT

## 2020-10-30 RX ORDER — CYCLOBENZAPRINE HYDROCHLORIDE 5 MG/1
5 TABLET, FILM COATED ORAL
Qty: 14 | Refills: 0 | Status: DISCONTINUED | COMMUNITY
Start: 2019-07-15 | End: 2020-10-30

## 2020-10-30 RX ORDER — GABAPENTIN 100 MG/1
100 CAPSULE ORAL 3 TIMES DAILY
Qty: 42 | Refills: 0 | Status: DISCONTINUED | COMMUNITY
Start: 2019-07-15 | End: 2020-10-30

## 2020-10-30 NOTE — PHYSICAL EXAM
[No Acute Distress] : no acute distress [Well Nourished] : well nourished [Well Developed] : well developed [Well-Appearing] : well-appearing [Normal Sclera/Conjunctiva] : normal sclera/conjunctiva [PERRL] : pupils equal round and reactive to light [EOMI] : extraocular movements intact [Normal Outer Ear/Nose] : the outer ears and nose were normal in appearance [Normal Oropharynx] : the oropharynx was normal [No JVD] : no jugular venous distention [No Lymphadenopathy] : no lymphadenopathy [Supple] : supple [No Respiratory Distress] : no respiratory distress  [No Accessory Muscle Use] : no accessory muscle use [Clear to Auscultation] : lungs were clear to auscultation bilaterally [Normal Rate] : normal rate  [Regular Rhythm] : with a regular rhythm [Normal S1, S2] : normal S1 and S2 [No Murmur] : no murmur heard [No Edema] : there was no peripheral edema [No Extremity Clubbing/Cyanosis] : no extremity clubbing/cyanosis [Soft] : abdomen soft [Non Tender] : non-tender [Normal Bowel Sounds] : normal bowel sounds [No Joint Swelling] : no joint swelling [Grossly Normal Strength/Tone] : grossly normal strength/tone [No Rash] : no rash [Coordination Grossly Intact] : coordination grossly intact [No Focal Deficits] : no focal deficits [Normal Affect] : the affect was normal [Normal Insight/Judgement] : insight and judgment were intact [Comprehensive Foot Exam Normal] : Right and left foot were examined and both feet are normal. No ulcers in either foot. Toes are normal and with full ROM.  Normal tactile sensation with monofilament testing throughout both feet

## 2020-11-01 NOTE — HISTORY OF PRESENT ILLNESS
[FreeTextEntry1] : CPE [de-identified] : 65 y.o with PMH type 2 DM (current A1c 8.2), hepatic steatosis presents for annual CPE.  Patient reports having hernia surgery since last CPE at [facility]. Patient reports problems with urinary retention requiring ED visit for catherization, and has since resolved. Patient has been isolating at home during COVID pandemic. Lives with wife and two kids. Denies any sick contacts/known COVID contacts. Denies fevers/chills/muscle aches/dysguesia/amnosia/SOB. Patient was evaluated by ophthalmologist in August 2020. Patient had colonoscopy on 3/2019 which was plague by sub-optimal preparation with the recommendation to repeat it.\par \par Patient reports cutting down his metformin dose from BID to once daily due to diarrhea. States that has cut back on his metformin dosage around Feb-March of 2020. Patient endorses drinking grapefruit juice along with tea and honey. Consoled patient on importance of avoiding sugary beverages.

## 2020-11-01 NOTE — HEALTH RISK ASSESSMENT
[Good] : ~his/her~  mood as  good [Intercurrent ED visits] : went to ED [No] : No [0] : 2) Feeling down, depressed, or hopeless: Not at all (0) [With Family] : lives with family [Retired] : retired [] : No [de-identified] : Sedentary  [de-identified] : Eggs/oatmeal for breakfast. Lunch/dinner brocoli with bee/chicken and white rice  [[id number]] : 0 [Reports changes in hearing] : Reports no changes in hearing [Reports changes in vision] : Reports no changes in vision [de-identified] : wife and children [de-identified] : Former

## 2020-11-01 NOTE — END OF VISIT
[] : Resident [FreeTextEntry3] : Agree with assessment and plan. Will need to review status of statin at next visit.

## 2020-11-01 NOTE — PLAN
[FreeTextEntry1] : 65 y.o M with PMH of type 2 DM and hepatic steatosis presents for annual CPE\par \par ##DM2\par -increase in A1c compared to last year, now 8.2 at POC\par -likely related to self-decrease in metformin administration\par -patient agreeable to resume metformin 1000mg BID\par -instructed patient to call clinic prior to self-adjusting metformin dose if experiences side effects\par -emphasized importance in diet and exercise, with avoidance of sugary beverages in particular\par -foot exam normal\par -ophthalmology visit done this year\par -instructed patient to follow up with me in 3 months for A1c check\par \par ##hepatic steatosis\par -will repeat CMP here along with lipid profile\par -as per GI likely a result of high glucose\par -GI referral for colonoscopy as above\par \par ##HCM\par -will order repeat referal to GI for repeat colonoscopy since attempt in 2019 hampered by sub-optimal prep\par -influneza shot today\par -pneumovax 13 today\par -a1c/urine, lipids, CMP, CBC\par \par dispo: RTC 3 months for diabetes\par \par case d/w Dr. Hall\par \par Mando Olivia MD, PGY-2\par Internal Medicine\par DARLENE

## 2020-11-03 LAB
ALBUMIN SERPL ELPH-MCNC: 5.1 G/DL
ALP BLD-CCNC: 66 U/L
ALT SERPL-CCNC: 52 U/L
ANION GAP SERPL CALC-SCNC: 16 MMOL/L
AST SERPL-CCNC: 31 U/L
BASOPHILS # BLD AUTO: 0.05 K/UL
BASOPHILS NFR BLD AUTO: 0.7 %
BILIRUB SERPL-MCNC: 1.3 MG/DL
BUN SERPL-MCNC: 12 MG/DL
CALCIUM SERPL-MCNC: 10.2 MG/DL
CHLORIDE SERPL-SCNC: 100 MMOL/L
CHOLEST SERPL-MCNC: 219 MG/DL
CO2 SERPL-SCNC: 22 MMOL/L
CREAT SERPL-MCNC: 0.95 MG/DL
CREAT SPEC-SCNC: 126 MG/DL
EOSINOPHIL # BLD AUTO: 0.21 K/UL
EOSINOPHIL NFR BLD AUTO: 2.9 %
ESTIMATED AVERAGE GLUCOSE: 183 MG/DL
GLUCOSE SERPL-MCNC: 165 MG/DL
HBA1C MFR BLD HPLC: 8 %
HCT VFR BLD CALC: 49.7 %
HDLC SERPL-MCNC: 50 MG/DL
HGB BLD-MCNC: 16.2 G/DL
IMM GRANULOCYTES NFR BLD AUTO: 0.4 %
LDLC SERPL CALC-MCNC: 128 MG/DL
LYMPHOCYTES # BLD AUTO: 2.31 K/UL
LYMPHOCYTES NFR BLD AUTO: 32 %
MAN DIFF?: NORMAL
MCHC RBC-ENTMCNC: 29 PG
MCHC RBC-ENTMCNC: 32.6 GM/DL
MCV RBC AUTO: 88.9 FL
MICROALBUMIN 24H UR DL<=1MG/L-MCNC: <1.2 MG/DL
MICROALBUMIN/CREAT 24H UR-RTO: NORMAL MG/G
MONOCYTES # BLD AUTO: 0.91 K/UL
MONOCYTES NFR BLD AUTO: 12.6 %
NEUTROPHILS # BLD AUTO: 3.7 K/UL
NEUTROPHILS NFR BLD AUTO: 51.4 %
NONHDLC SERPL-MCNC: 169 MG/DL
PLATELET # BLD AUTO: 261 K/UL
POTASSIUM SERPL-SCNC: 4.9 MMOL/L
PROT SERPL-MCNC: 7.5 G/DL
RBC # BLD: 5.59 M/UL
RBC # FLD: 12.9 %
SODIUM SERPL-SCNC: 137 MMOL/L
TRIGL SERPL-MCNC: 208 MG/DL
WBC # FLD AUTO: 7.21 K/UL

## 2020-11-06 ENCOUNTER — NON-APPOINTMENT (OUTPATIENT)
Age: 65
End: 2020-11-06

## 2020-11-06 DIAGNOSIS — K76.0 FATTY (CHANGE OF) LIVER, NOT ELSEWHERE CLASSIFIED: ICD-10-CM

## 2020-11-06 DIAGNOSIS — Z23 ENCOUNTER FOR IMMUNIZATION: ICD-10-CM

## 2020-11-06 DIAGNOSIS — Z00.00 ENCOUNTER FOR GENERAL ADULT MEDICAL EXAMINATION WITHOUT ABNORMAL FINDINGS: ICD-10-CM

## 2020-11-06 DIAGNOSIS — E11.9 TYPE 2 DIABETES MELLITUS WITHOUT COMPLICATIONS: ICD-10-CM

## 2021-02-08 ENCOUNTER — RX RENEWAL (OUTPATIENT)
Age: 66
End: 2021-02-08

## 2021-04-07 ENCOUNTER — RX RENEWAL (OUTPATIENT)
Age: 66
End: 2021-04-07

## 2021-04-16 ENCOUNTER — NON-APPOINTMENT (OUTPATIENT)
Age: 66
End: 2021-04-16

## 2021-04-26 ENCOUNTER — OUTPATIENT (OUTPATIENT)
Dept: OUTPATIENT SERVICES | Facility: HOSPITAL | Age: 66
LOS: 1 days | End: 2021-04-26
Payer: COMMERCIAL

## 2021-04-26 ENCOUNTER — APPOINTMENT (OUTPATIENT)
Dept: INTERNAL MEDICINE | Facility: CLINIC | Age: 66
End: 2021-04-26
Payer: MEDICARE

## 2021-04-26 ENCOUNTER — LABORATORY RESULT (OUTPATIENT)
Age: 66
End: 2021-04-26

## 2021-04-26 VITALS
DIASTOLIC BLOOD PRESSURE: 80 MMHG | BODY MASS INDEX: 23.56 KG/M2 | OXYGEN SATURATION: 99 % | HEART RATE: 60 BPM | WEIGHT: 138 LBS | SYSTOLIC BLOOD PRESSURE: 120 MMHG | HEIGHT: 64 IN

## 2021-04-26 DIAGNOSIS — Z90.49 ACQUIRED ABSENCE OF OTHER SPECIFIED PARTS OF DIGESTIVE TRACT: Chronic | ICD-10-CM

## 2021-04-26 DIAGNOSIS — I10 ESSENTIAL (PRIMARY) HYPERTENSION: ICD-10-CM

## 2021-04-26 LAB
A1C WITH ESTIMATED AVERAGE GLUCOSE RESULT: 7.1 % — HIGH (ref 4–5.6)
ESTIMATED AVERAGE GLUCOSE: 157 MG/DL — HIGH (ref 68–114)

## 2021-04-26 PROCEDURE — 80061 LIPID PANEL: CPT

## 2021-04-26 PROCEDURE — 83036 HEMOGLOBIN GLYCOSYLATED A1C: CPT

## 2021-04-26 PROCEDURE — 82043 UR ALBUMIN QUANTITATIVE: CPT

## 2021-04-26 PROCEDURE — 99214 OFFICE O/P EST MOD 30 MIN: CPT | Mod: GC

## 2021-04-26 PROCEDURE — G0463: CPT

## 2021-04-26 NOTE — PHYSICAL EXAM
[Normal] : normal rate, regular rhythm, normal S1 and S2 and no murmur heard [Soft] : abdomen soft [Non Tender] : non-tender [Normal Bowel Sounds] : normal bowel sounds [No Rash] : no rash [Coordination Grossly Intact] : coordination grossly intact [No Focal Deficits] : no focal deficits

## 2021-04-27 LAB
CHOLEST SERPL-MCNC: 222 MG/DL — HIGH
CREAT ?TM UR-MCNC: 149 MG/DL — SIGNIFICANT CHANGE UP
HDLC SERPL-MCNC: 54 MG/DL — SIGNIFICANT CHANGE UP
LIPID PNL WITH DIRECT LDL SERPL: 145 MG/DL — HIGH
MICROALBUMIN UR-MCNC: <1.2 MG/DL — SIGNIFICANT CHANGE UP
MICROALBUMIN/CREAT UR-RTO: SIGNIFICANT CHANGE UP MG/G (ref 0–30)
NON HDL CHOLESTEROL: 168 MG/DL — HIGH
TRIGL SERPL-MCNC: 117 MG/DL — SIGNIFICANT CHANGE UP

## 2021-04-27 NOTE — ASSESSMENT
[FreeTextEntry1] : The patient is a 65 year old man with PMH of DM2, HLD and hepatic steatosis. Here today for follow up of diabetes\par \par #Type II Diabetes\par -A1c drawn today\par -microalbumin/Cr ratio done today\par -FreeStyle Ravi glucometer if covered by insurance, if not will send prescription for new glucometer to pharmacy \par -Advised patient to try taking nighttime metformin prior to dinner to reduce diarrhea. \par \par #HLD\par -Lipid panel drawn today\par -Start atorvastatin 20mg \par \par RTC in 5 weeks for follow up diabetes

## 2021-04-27 NOTE — REVIEW OF SYSTEMS
[Diarrhea] : diarrhea [Negative] : Constitutional [Fever] : no fever [Chills] : no chills [Fatigue] : no fatigue [Night Sweats] : no night sweats [Chest Pain] : no chest pain [Palpitations] : no palpitations [Shortness Of Breath] : no shortness of breath [Cough] : no cough [Abdominal Pain] : no abdominal pain [Nausea] : no nausea [Constipation] : no constipation

## 2021-04-27 NOTE — HISTORY OF PRESENT ILLNESS
[FreeTextEntry1] : follow up diabetes [de-identified] : The patient is a 65 year old man with PMH of DM2, HLD and hepatic steatosis. Here today for follow up of diabetes..\darren Has been taking metformin 1000mg BID. Admits that he misses the nighttime dose 4/7 days of the week. He sometimes skips the evening dose intentionally because it gives him diarrhea and he works very early in the morning, and doesn't want to have diarrhea at work. \darren Has not been checking sugars at home. His meter broke and has not gotten a new one since. Is interested in getting the new type of glucometer. \darren Diet consists of steak for dinner with mashed potatoes or vegetables. Breakfast is cup of coffee with a crossoint, Lunch is a sandwich on whole wheat bread  or burger mandy. \darren Works as an . His wife is sick with dementia, he has been having to care for her and his two teenage children. Endorses good sleep, gets 8-9 hours. \darren Does not smoke or drink alcohol.

## 2021-05-03 DIAGNOSIS — E11.9 TYPE 2 DIABETES MELLITUS WITHOUT COMPLICATIONS: ICD-10-CM

## 2021-05-03 DIAGNOSIS — E78.5 HYPERLIPIDEMIA, UNSPECIFIED: ICD-10-CM

## 2021-05-21 ENCOUNTER — RX RENEWAL (OUTPATIENT)
Age: 66
End: 2021-05-21

## 2021-05-28 NOTE — ASU PREOP CHECKLIST - NS PREOP CHK MONITOR ANESTHESIA CONSENT
Assessment/Plan:     Weight gain   Patient did lose 5lbs with phentermine 30 mg  Will increase dosage 37 5 mg   Encouraged diet, exercise  Acne vulgaris   Reports that she does get a rash with the phentermine  Has use Clindagel with success  Medication reordered  Discussed good skin care  Routine  Problem List Items Addressed This Visit        Musculoskeletal and Integument    Acne vulgaris      Reports that she does get a rash with the phentermine  Has use Clindagel with success  Medication reordered  Discussed good skin care  Routine  Relevant Medications    clindamycin (CLINDAGEL) 1 % gel       Other    Weight gain - Primary      Patient did lose 5lbs with phentermine 30 mg  Will increase dosage 37 5 mg   Encouraged diet, exercise  Relevant Medications    phentermine 37 5 MG capsule            Subjective:      Patient ID: Justin Gallegos is a 36 y o  female  Patient is here for follow-up on weight loss efforts  Has been using phentermine with no side effects  Has increased exercise activity  Patient lost 5 lb      The following portions of the patient's history were reviewed and updated as appropriate: allergies, current medications, past family history, past medical history, past social history, past surgical history and problem list     Review of Systems   Constitutional: Negative  HENT: Negative  Eyes: Negative  Respiratory: Negative  Cardiovascular: Negative  Gastrointestinal: Negative  Endocrine: Negative  Genitourinary: Negative  Musculoskeletal: Negative  Skin: Positive for rash ( around neck and face, normally gets this with phentermine)  Allergic/Immunologic: Negative  Neurological: Negative  Psychiatric/Behavioral: Negative  Objective:      /68   Pulse 97   Resp 19   Ht 5' 7" (1 702 m)   Wt (!) 155 kg (342 lb)   SpO2 99%   BMI 53 56 kg/m²          Physical Exam  Vitals signs and nursing note reviewed  Constitutional:       Appearance: She is well-developed  HENT:      Head: Normocephalic and atraumatic  Right Ear: External ear normal       Left Ear: External ear normal    Eyes:      Pupils: Pupils are equal, round, and reactive to light  Neck:      Musculoskeletal: Normal range of motion  Cardiovascular:      Rate and Rhythm: Normal rate and regular rhythm  Pulmonary:      Effort: Pulmonary effort is normal    Abdominal:      General: Bowel sounds are normal       Palpations: Abdomen is soft  Musculoskeletal: Normal range of motion  Skin:     General: Skin is warm and dry  Findings: Rash ( acne on neck and face) present  Neurological:      Mental Status: She is alert and oriented to person, place, and time  Psychiatric:         Mood and Affect: Mood normal          Behavior: Behavior normal          Thought Content:  Thought content normal          Judgment: Judgment normal            Labs:    Lab Results   Component Value Date    WBC 5 79 12/01/2020    HGB 13 9 12/01/2020    HCT 45 3 12/01/2020    MCV 89 12/01/2020     12/01/2020     Lab Results   Component Value Date    K 4 4 12/01/2020     12/01/2020    CO2 31 12/01/2020    BUN 12 12/01/2020    CREATININE 0 83 12/01/2020    GLUF 86 12/07/2018    CALCIUM 9 4 12/01/2020    AST 13 12/01/2020    ALT 22 12/01/2020    ALKPHOS 119 (H) 12/01/2020    EGFR 88 12/01/2020     Lab Results   Component Value Date    CALCIUM 9 4 12/01/2020    K 4 4 12/01/2020    CO2 31 12/01/2020     12/01/2020    BUN 12 12/01/2020    CREATININE 0 83 12/01/2020 done

## 2021-08-05 ENCOUNTER — RX RENEWAL (OUTPATIENT)
Age: 66
End: 2021-08-05

## 2021-10-04 ENCOUNTER — RX RENEWAL (OUTPATIENT)
Age: 66
End: 2021-10-04

## 2021-11-04 ENCOUNTER — NON-APPOINTMENT (OUTPATIENT)
Age: 66
End: 2021-11-04

## 2021-11-05 ENCOUNTER — APPOINTMENT (OUTPATIENT)
Dept: INTERNAL MEDICINE | Facility: CLINIC | Age: 66
End: 2021-11-05
Payer: MEDICARE

## 2021-11-05 ENCOUNTER — LABORATORY RESULT (OUTPATIENT)
Age: 66
End: 2021-11-05

## 2021-11-05 ENCOUNTER — OUTPATIENT (OUTPATIENT)
Dept: OUTPATIENT SERVICES | Facility: HOSPITAL | Age: 66
LOS: 1 days | End: 2021-11-05
Payer: COMMERCIAL

## 2021-11-05 VITALS
HEIGHT: 64 IN | BODY MASS INDEX: 23.56 KG/M2 | SYSTOLIC BLOOD PRESSURE: 120 MMHG | DIASTOLIC BLOOD PRESSURE: 70 MMHG | HEART RATE: 71 BPM | WEIGHT: 138 LBS | OXYGEN SATURATION: 98 %

## 2021-11-05 DIAGNOSIS — Z90.49 ACQUIRED ABSENCE OF OTHER SPECIFIED PARTS OF DIGESTIVE TRACT: Chronic | ICD-10-CM

## 2021-11-05 DIAGNOSIS — I10 ESSENTIAL (PRIMARY) HYPERTENSION: ICD-10-CM

## 2021-11-05 LAB
A1C WITH ESTIMATED AVERAGE GLUCOSE RESULT: 8.7 % — HIGH (ref 4–5.6)
ALBUMIN SERPL ELPH-MCNC: 4.8 G/DL — SIGNIFICANT CHANGE UP (ref 3.3–5)
ALP SERPL-CCNC: 88 U/L — SIGNIFICANT CHANGE UP (ref 40–120)
ALT FLD-CCNC: 35 U/L — SIGNIFICANT CHANGE UP (ref 10–45)
ANION GAP SERPL CALC-SCNC: 13 MMOL/L — SIGNIFICANT CHANGE UP (ref 5–17)
AST SERPL-CCNC: 21 U/L — SIGNIFICANT CHANGE UP (ref 10–40)
BILIRUB SERPL-MCNC: 1.6 MG/DL — HIGH (ref 0.2–1.2)
BUN SERPL-MCNC: 13 MG/DL — SIGNIFICANT CHANGE UP (ref 7–23)
CALCIUM SERPL-MCNC: 10.4 MG/DL — SIGNIFICANT CHANGE UP (ref 8.4–10.5)
CHLORIDE SERPL-SCNC: 97 MMOL/L — SIGNIFICANT CHANGE UP (ref 96–108)
CO2 SERPL-SCNC: 25 MMOL/L — SIGNIFICANT CHANGE UP (ref 22–31)
CREAT SERPL-MCNC: 0.88 MG/DL — SIGNIFICANT CHANGE UP (ref 0.5–1.3)
ESTIMATED AVERAGE GLUCOSE: 203 MG/DL — HIGH (ref 68–114)
GLUCOSE SERPL-MCNC: 259 MG/DL — HIGH (ref 70–99)
HCT VFR BLD CALC: 46.7 % — SIGNIFICANT CHANGE UP (ref 39–50)
HGB BLD-MCNC: 15.7 G/DL — SIGNIFICANT CHANGE UP (ref 13–17)
MCHC RBC-ENTMCNC: 29.4 PG — SIGNIFICANT CHANGE UP (ref 27–34)
MCHC RBC-ENTMCNC: 33.6 GM/DL — SIGNIFICANT CHANGE UP (ref 32–36)
MCV RBC AUTO: 87.5 FL — SIGNIFICANT CHANGE UP (ref 80–100)
PLATELET # BLD AUTO: 255 K/UL — SIGNIFICANT CHANGE UP (ref 150–400)
POTASSIUM SERPL-MCNC: 4.7 MMOL/L — SIGNIFICANT CHANGE UP (ref 3.5–5.3)
POTASSIUM SERPL-SCNC: 4.7 MMOL/L — SIGNIFICANT CHANGE UP (ref 3.5–5.3)
PROT SERPL-MCNC: 7.5 G/DL — SIGNIFICANT CHANGE UP (ref 6–8.3)
RBC # BLD: 5.34 M/UL — SIGNIFICANT CHANGE UP (ref 4.2–5.8)
RBC # FLD: 13.2 % — SIGNIFICANT CHANGE UP (ref 10.3–14.5)
SODIUM SERPL-SCNC: 135 MMOL/L — SIGNIFICANT CHANGE UP (ref 135–145)
WBC # BLD: 7.2 K/UL — SIGNIFICANT CHANGE UP (ref 3.8–10.5)
WBC # FLD AUTO: 7.2 K/UL — SIGNIFICANT CHANGE UP (ref 3.8–10.5)

## 2021-11-05 PROCEDURE — G0463: CPT | Mod: 25

## 2021-11-05 PROCEDURE — 82043 UR ALBUMIN QUANTITATIVE: CPT

## 2021-11-05 PROCEDURE — 99213 OFFICE O/P EST LOW 20 MIN: CPT | Mod: GE

## 2021-11-05 PROCEDURE — 80053 COMPREHEN METABOLIC PANEL: CPT

## 2021-11-05 PROCEDURE — 90688 IIV4 VACCINE SPLT 0.5 ML IM: CPT

## 2021-11-05 PROCEDURE — G0008: CPT

## 2021-11-05 PROCEDURE — 85027 COMPLETE CBC AUTOMATED: CPT

## 2021-11-05 PROCEDURE — 83036 HEMOGLOBIN GLYCOSYLATED A1C: CPT

## 2021-11-05 NOTE — HISTORY OF PRESENT ILLNESS
[FreeTextEntry1] : Comprehensive exam [de-identified] : 66 y.o with PMH type 2 DM (current A1c 8.8), HLD hepatic steatosis presents for annual CPE. Today patient has no particular complaints. He has continued to feel well since last visit and has received full covid vaccination as of October. He has been watching his diet but recently was in [place] visiting family and he overindulged. He endorses adequate exercise. He has not been able to check his BG in about 2 months because his monitor had broken. He denies polyuria, polydipsia, nausea/vomiting, and abdominal pain.

## 2021-11-05 NOTE — HEALTH RISK ASSESSMENT
[Good] : ~his/her~ current health as good [Very Good] : ~his/her~  mood as very good [0] : 2) Feeling down, depressed, or hopeless: Not at all (0) [With Family] : lives with family [Employed] : employed [Fully functional (bathing, dressing, toileting, transferring, walking, feeding)] : Fully functional (bathing, dressing, toileting, transferring, walking, feeding) [FreeTextEntry1] : Son is giving him a difficult time with staying in school  [FreeTextEntry2] :

## 2021-11-05 NOTE — ASSESSMENT
[FreeTextEntry1] : #HCM\par - CBC, CMP\par - Received High dose flu vaccine today\par - Due for Tdap, pneumonia but deferred today as he is concerned about receiving too many vaccines in such close proximity (reiterated CDC guidelines to assuage concerns)\par - Will follow up on vaccines at next visit. \par

## 2021-11-06 LAB
CREAT ?TM UR-MCNC: 77 MG/DL — SIGNIFICANT CHANGE UP
MICROALBUMIN UR-MCNC: <1.2 MG/DL — SIGNIFICANT CHANGE UP
MICROALBUMIN/CREAT UR-RTO: SIGNIFICANT CHANGE UP MG/G (ref 0–30)

## 2021-11-08 ENCOUNTER — NON-APPOINTMENT (OUTPATIENT)
Age: 66
End: 2021-11-08

## 2021-11-08 LAB — HBA1C MFR BLD HPLC: 8.8

## 2021-11-10 DIAGNOSIS — Z23 ENCOUNTER FOR IMMUNIZATION: ICD-10-CM

## 2021-11-10 DIAGNOSIS — E11.9 TYPE 2 DIABETES MELLITUS WITHOUT COMPLICATIONS: ICD-10-CM

## 2022-02-28 ENCOUNTER — NON-APPOINTMENT (OUTPATIENT)
Age: 67
End: 2022-02-28

## 2022-02-28 ENCOUNTER — APPOINTMENT (OUTPATIENT)
Dept: OPHTHALMOLOGY | Facility: CLINIC | Age: 67
End: 2022-02-28
Payer: MEDICARE

## 2022-02-28 PROCEDURE — 92014 COMPRE OPH EXAM EST PT 1/>: CPT

## 2022-02-28 PROCEDURE — 92015 DETERMINE REFRACTIVE STATE: CPT

## 2022-03-04 ENCOUNTER — OUTPATIENT (OUTPATIENT)
Dept: OUTPATIENT SERVICES | Facility: HOSPITAL | Age: 67
LOS: 1 days | End: 2022-03-04
Payer: MEDICARE

## 2022-03-04 ENCOUNTER — APPOINTMENT (OUTPATIENT)
Dept: INTERNAL MEDICINE | Facility: CLINIC | Age: 67
End: 2022-03-04
Payer: MEDICARE

## 2022-03-04 VITALS
WEIGHT: 127 LBS | OXYGEN SATURATION: 97 % | BODY MASS INDEX: 21.68 KG/M2 | HEIGHT: 64 IN | HEART RATE: 66 BPM | DIASTOLIC BLOOD PRESSURE: 78 MMHG | SYSTOLIC BLOOD PRESSURE: 116 MMHG

## 2022-03-04 DIAGNOSIS — E80.6 OTHER DISORDERS OF BILIRUBIN METABOLISM: ICD-10-CM

## 2022-03-04 DIAGNOSIS — Z90.49 ACQUIRED ABSENCE OF OTHER SPECIFIED PARTS OF DIGESTIVE TRACT: Chronic | ICD-10-CM

## 2022-03-04 DIAGNOSIS — I10 ESSENTIAL (PRIMARY) HYPERTENSION: ICD-10-CM

## 2022-03-04 DIAGNOSIS — Z00.00 ENCOUNTER FOR GENERAL ADULT MEDICAL EXAMINATION W/OUT ABNORMAL FINDINGS: ICD-10-CM

## 2022-03-04 LAB — HBA1C MFR BLD HPLC: 7.2

## 2022-03-04 PROCEDURE — 99214 OFFICE O/P EST MOD 30 MIN: CPT | Mod: GC

## 2022-03-07 ENCOUNTER — LABORATORY RESULT (OUTPATIENT)
Age: 67
End: 2022-03-07

## 2022-03-07 NOTE — PHYSICAL EXAM
[No Acute Distress] : no acute distress [Well Developed] : well developed [Normal Sclera/Conjunctiva] : normal sclera/conjunctiva [PERRL] : pupils equal round and reactive to light [EOMI] : extraocular movements intact [Normal Outer Ear/Nose] : the outer ears and nose were normal in appearance [Normal TMs] : both tympanic membranes were normal [No Respiratory Distress] : no respiratory distress  [Clear to Auscultation] : lungs were clear to auscultation bilaterally [Normal Rate] : normal rate  [Regular Rhythm] : with a regular rhythm [No Murmur] : no murmur heard [No Edema] : there was no peripheral edema [Soft] : abdomen soft [Non Tender] : non-tender [No HSM] : no HSM [No Joint Swelling] : no joint swelling [Grossly Normal Strength/Tone] : grossly normal strength/tone [Coordination Grossly Intact] : coordination grossly intact [No Focal Deficits] : no focal deficits [Normal Affect] : the affect was normal [Normal Insight/Judgement] : insight and judgment were intact [de-identified] : R

## 2022-03-07 NOTE — REVIEW OF SYSTEMS
[Fever] : no fever [Chills] : no chills [Fatigue] : no fatigue [Night Sweats] : no night sweats [Recent Change In Weight] : ~T no recent weight change [Pain] : no pain [Redness] : no redness [Vision Problems] : no vision problems [Itching] : no itching [Earache] : no earache [Hearing Loss] : no hearing loss [Nasal Discharge] : no nasal discharge [Sore Throat] : no sore throat [Chest Pain] : no chest pain [Palpitations] : no palpitations [Lower Ext Edema] : no lower extremity edema [Shortness Of Breath] : no shortness of breath [Wheezing] : no wheezing [Cough] : no cough [Dyspnea on Exertion] : not dyspnea on exertion [Nausea] : no nausea [Constipation] : no constipation [Diarrhea] : no diarrhea [Vomiting] : no vomiting [Heartburn] : no heartburn [Dysuria] : no dysuria [Incontinence] : no incontinence [Hematuria] : no hematuria [Frequency] : no frequency [Joint Pain] : no joint pain [Joint Stiffness] : no joint stiffness [Muscle Pain] : no muscle pain [Back Pain] : no back pain [Joint Swelling] : no joint swelling [Itching] : no itching [Mole Changes] : no mole changes [Skin Rash] : no skin rash [Headache] : no headache [Dizziness] : no dizziness [Fainting] : no fainting [Memory Loss] : no memory loss [Anxiety] : no anxiety [Depression] : no depression [FreeTextEntry4] : L ear tinnitus

## 2022-03-07 NOTE — HISTORY OF PRESENT ILLNESS
[FreeTextEntry1] : follow up [de-identified] : 66M with T2DM, HLD, and hepatic steatosis here for f/u.\par \par #T2DM\par Has not been checking sugars. \par Metformin 1000 daily - can't take twice a day due to diarrhea\par POC A1c [8.8 11/2021, repeat 7.2% today], Elevated Bili 1.6 11/2021\par Vaccines due [PNA23, TDAP, shingles]\par \par #Weight loss\par 10lb unintentional weight loss over the past 5 months. Increased stress d/t wife's recently diagnosed early onset dementia. Has not been eating as much.

## 2022-03-08 PROCEDURE — 80053 COMPREHEN METABOLIC PANEL: CPT

## 2022-03-08 PROCEDURE — 90732 PPSV23 VACC 2 YRS+ SUBQ/IM: CPT

## 2022-03-08 PROCEDURE — G0463: CPT | Mod: 25

## 2022-03-08 PROCEDURE — 85025 COMPLETE CBC W/AUTO DIFF WBC: CPT

## 2022-03-08 PROCEDURE — 83036 HEMOGLOBIN GLYCOSYLATED A1C: CPT

## 2022-03-08 PROCEDURE — G0009: CPT

## 2022-03-08 PROCEDURE — 80061 LIPID PANEL: CPT

## 2022-03-09 ENCOUNTER — NON-APPOINTMENT (OUTPATIENT)
Age: 67
End: 2022-03-09

## 2022-03-09 LAB
ALBUMIN SERPL ELPH-MCNC: 4.9 G/DL — SIGNIFICANT CHANGE UP (ref 3.3–5)
ALP SERPL-CCNC: 75 U/L — SIGNIFICANT CHANGE UP (ref 40–120)
ALT FLD-CCNC: 34 U/L — SIGNIFICANT CHANGE UP (ref 10–45)
ANION GAP SERPL CALC-SCNC: 28 MMOL/L — HIGH (ref 5–17)
AST SERPL-CCNC: 25 U/L — SIGNIFICANT CHANGE UP (ref 10–40)
BASOPHILS # BLD AUTO: 0.04 K/UL — SIGNIFICANT CHANGE UP (ref 0–0.2)
BASOPHILS NFR BLD AUTO: 0.5 % — SIGNIFICANT CHANGE UP (ref 0–2)
BILIRUB SERPL-MCNC: 1.6 MG/DL — HIGH (ref 0.2–1.2)
BUN SERPL-MCNC: 16 MG/DL — SIGNIFICANT CHANGE UP (ref 7–23)
CALCIUM SERPL-MCNC: 10.1 MG/DL — SIGNIFICANT CHANGE UP (ref 8.4–10.5)
CHLORIDE SERPL-SCNC: 98 MMOL/L — SIGNIFICANT CHANGE UP (ref 96–108)
CHOLEST SERPL-MCNC: 173 MG/DL — SIGNIFICANT CHANGE UP
CO2 SERPL-SCNC: 18 MMOL/L — LOW (ref 22–31)
CREAT SERPL-MCNC: 0.9 MG/DL — SIGNIFICANT CHANGE UP (ref 0.5–1.3)
EGFR: 94 ML/MIN/1.73M2 — SIGNIFICANT CHANGE UP
EOSINOPHIL # BLD AUTO: 0.2 K/UL — SIGNIFICANT CHANGE UP (ref 0–0.5)
EOSINOPHIL NFR BLD AUTO: 2.7 % — SIGNIFICANT CHANGE UP (ref 0–6)
GLUCOSE SERPL-MCNC: 75 MG/DL — SIGNIFICANT CHANGE UP (ref 70–99)
HCT VFR BLD CALC: 56.4 % — HIGH (ref 39–50)
HDLC SERPL-MCNC: 50 MG/DL — SIGNIFICANT CHANGE UP
HGB BLD-MCNC: 17.7 G/DL — HIGH (ref 13–17)
IMM GRANULOCYTES NFR BLD AUTO: 0.3 % — SIGNIFICANT CHANGE UP (ref 0–1.5)
LIPID PNL WITH DIRECT LDL SERPL: 90 MG/DL — SIGNIFICANT CHANGE UP
LYMPHOCYTES # BLD AUTO: 2.24 K/UL — SIGNIFICANT CHANGE UP (ref 1–3.3)
LYMPHOCYTES # BLD AUTO: 29.8 % — SIGNIFICANT CHANGE UP (ref 13–44)
MCHC RBC-ENTMCNC: 29 PG — SIGNIFICANT CHANGE UP (ref 27–34)
MCHC RBC-ENTMCNC: 31.4 GM/DL — LOW (ref 32–36)
MCV RBC AUTO: 92.3 FL — SIGNIFICANT CHANGE UP (ref 80–100)
MONOCYTES # BLD AUTO: 0.89 K/UL — SIGNIFICANT CHANGE UP (ref 0–0.9)
MONOCYTES NFR BLD AUTO: 11.8 % — SIGNIFICANT CHANGE UP (ref 2–14)
NEUTROPHILS # BLD AUTO: 4.13 K/UL — SIGNIFICANT CHANGE UP (ref 1.8–7.4)
NEUTROPHILS NFR BLD AUTO: 54.9 % — SIGNIFICANT CHANGE UP (ref 43–77)
NON HDL CHOLESTEROL: 123 MG/DL — SIGNIFICANT CHANGE UP
PLATELET # BLD AUTO: 259 K/UL — SIGNIFICANT CHANGE UP (ref 150–400)
POTASSIUM SERPL-MCNC: 4.5 MMOL/L — SIGNIFICANT CHANGE UP (ref 3.5–5.3)
POTASSIUM SERPL-SCNC: 4.5 MMOL/L — SIGNIFICANT CHANGE UP (ref 3.5–5.3)
PROT SERPL-MCNC: 7.6 G/DL — SIGNIFICANT CHANGE UP (ref 6–8.3)
RBC # BLD: 6.11 M/UL — HIGH (ref 4.2–5.8)
RBC # FLD: 13.9 % — SIGNIFICANT CHANGE UP (ref 10.3–14.5)
SODIUM SERPL-SCNC: 143 MMOL/L — SIGNIFICANT CHANGE UP (ref 135–145)
TRIGL SERPL-MCNC: 164 MG/DL — HIGH
WBC # BLD: 7.52 K/UL — SIGNIFICANT CHANGE UP (ref 3.8–10.5)
WBC # FLD AUTO: 7.52 K/UL — SIGNIFICANT CHANGE UP (ref 3.8–10.5)

## 2022-03-14 DIAGNOSIS — E80.6 OTHER DISORDERS OF BILIRUBIN METABOLISM: ICD-10-CM

## 2022-03-14 DIAGNOSIS — Z23 ENCOUNTER FOR IMMUNIZATION: ICD-10-CM

## 2022-03-14 DIAGNOSIS — E11.9 TYPE 2 DIABETES MELLITUS WITHOUT COMPLICATIONS: ICD-10-CM

## 2022-07-22 ENCOUNTER — APPOINTMENT (OUTPATIENT)
Dept: INTERNAL MEDICINE | Facility: CLINIC | Age: 67
End: 2022-07-22

## 2022-07-22 ENCOUNTER — OUTPATIENT (OUTPATIENT)
Dept: OUTPATIENT SERVICES | Facility: HOSPITAL | Age: 67
LOS: 1 days | End: 2022-07-22
Payer: MEDICARE

## 2022-07-22 VITALS
SYSTOLIC BLOOD PRESSURE: 114 MMHG | WEIGHT: 132 LBS | DIASTOLIC BLOOD PRESSURE: 80 MMHG | OXYGEN SATURATION: 97 % | HEART RATE: 61 BPM | BODY MASS INDEX: 22.53 KG/M2 | HEIGHT: 64 IN

## 2022-07-22 DIAGNOSIS — I10 ESSENTIAL (PRIMARY) HYPERTENSION: ICD-10-CM

## 2022-07-22 DIAGNOSIS — Z23 ENCOUNTER FOR IMMUNIZATION: ICD-10-CM

## 2022-07-22 DIAGNOSIS — Z90.49 ACQUIRED ABSENCE OF OTHER SPECIFIED PARTS OF DIGESTIVE TRACT: Chronic | ICD-10-CM

## 2022-07-22 LAB — HBA1C MFR BLD HPLC: 9

## 2022-07-22 PROCEDURE — 99213 OFFICE O/P EST LOW 20 MIN: CPT | Mod: GE

## 2022-07-22 PROCEDURE — 83036 HEMOGLOBIN GLYCOSYLATED A1C: CPT

## 2022-07-22 PROCEDURE — G0463: CPT | Mod: 25

## 2022-07-22 PROCEDURE — 90715 TDAP VACCINE 7 YRS/> IM: CPT

## 2022-07-22 PROCEDURE — 90471 IMMUNIZATION ADMIN: CPT

## 2022-07-22 RX ORDER — EMPAGLIFLOZIN 10 MG/1
10 TABLET, FILM COATED ORAL DAILY
Qty: 30 | Refills: 1 | Status: DISCONTINUED | COMMUNITY
Start: 2021-11-05 | End: 2022-07-22

## 2022-07-22 NOTE — PHYSICAL EXAM
[No Acute Distress] : no acute distress [Well Nourished] : well nourished [Well Developed] : well developed [Well-Appearing] : well-appearing [Normal Sclera/Conjunctiva] : normal sclera/conjunctiva [Normal Outer Ear/Nose] : the outer ears and nose were normal in appearance [Normal Oropharynx] : the oropharynx was normal [Supple] : supple [No Respiratory Distress] : no respiratory distress  [No Accessory Muscle Use] : no accessory muscle use [Normal Rate] : normal rate  [Regular Rhythm] : with a regular rhythm [Normal S1, S2] : normal S1 and S2 [No Edema] : there was no peripheral edema [No Joint Swelling] : no joint swelling [Grossly Normal Strength/Tone] : grossly normal strength/tone [No Rash] : no rash [Coordination Grossly Intact] : coordination grossly intact [No Focal Deficits] : no focal deficits [Normal Affect] : the affect was normal [Normal Insight/Judgement] : insight and judgment were intact [Kyphosis] : no kyphosis [Scoliosis] : no scoliosis

## 2022-07-22 NOTE — HISTORY OF PRESENT ILLNESS
[FreeTextEntry1] : A1C follow up [de-identified] : 66YOM with Hx T2DM, prev on jardiance and metformin but has not been taking jardiance for 2 months due to frequent urination and ran out of metformin pills 1 month ago. Renewed metformin and prescribed januvia 50 mg once a day. Pt. also received TdaP vaccine today. Deferred shingles vaccine today. Pt. to RTC in 10 weeks for A1c f/u and CPE.

## 2022-08-03 DIAGNOSIS — E11.9 TYPE 2 DIABETES MELLITUS WITHOUT COMPLICATIONS: ICD-10-CM

## 2022-08-03 DIAGNOSIS — Z23 ENCOUNTER FOR IMMUNIZATION: ICD-10-CM

## 2022-11-07 ENCOUNTER — APPOINTMENT (OUTPATIENT)
Dept: INTERNAL MEDICINE | Facility: CLINIC | Age: 67
End: 2022-11-07

## 2022-11-07 ENCOUNTER — OUTPATIENT (OUTPATIENT)
Dept: OUTPATIENT SERVICES | Facility: HOSPITAL | Age: 67
LOS: 1 days | End: 2022-11-07
Payer: MEDICARE

## 2022-11-07 VITALS
HEART RATE: 63 BPM | HEIGHT: 64 IN | WEIGHT: 134 LBS | BODY MASS INDEX: 22.88 KG/M2 | OXYGEN SATURATION: 98 % | DIASTOLIC BLOOD PRESSURE: 72 MMHG | SYSTOLIC BLOOD PRESSURE: 118 MMHG

## 2022-11-07 DIAGNOSIS — Z90.49 ACQUIRED ABSENCE OF OTHER SPECIFIED PARTS OF DIGESTIVE TRACT: Chronic | ICD-10-CM

## 2022-11-07 DIAGNOSIS — I10 ESSENTIAL (PRIMARY) HYPERTENSION: ICD-10-CM

## 2022-11-07 DIAGNOSIS — K76.0 FATTY (CHANGE OF) LIVER, NOT ELSEWHERE CLASSIFIED: ICD-10-CM

## 2022-11-07 DIAGNOSIS — E78.5 HYPERLIPIDEMIA, UNSPECIFIED: ICD-10-CM

## 2022-11-07 LAB — HBA1C MFR BLD HPLC: 7.2

## 2022-11-07 PROCEDURE — 85025 COMPLETE CBC W/AUTO DIFF WBC: CPT

## 2022-11-07 PROCEDURE — G0008: CPT

## 2022-11-07 PROCEDURE — 80061 LIPID PANEL: CPT

## 2022-11-07 PROCEDURE — 90688 IIV4 VACCINE SPLT 0.5 ML IM: CPT

## 2022-11-07 PROCEDURE — 83036 HEMOGLOBIN GLYCOSYLATED A1C: CPT

## 2022-11-07 PROCEDURE — G0439: CPT | Mod: 25

## 2022-11-07 PROCEDURE — 99397 PER PM REEVAL EST PAT 65+ YR: CPT | Mod: GC,GY

## 2022-11-07 PROCEDURE — 80053 COMPREHEN METABOLIC PANEL: CPT

## 2022-11-07 PROCEDURE — 82043 UR ALBUMIN QUANTITATIVE: CPT

## 2022-11-11 LAB
ALBUMIN SERPL ELPH-MCNC: 4.7 G/DL
ALP BLD-CCNC: 66 U/L
ALT SERPL-CCNC: 37 U/L
ANION GAP SERPL CALC-SCNC: 12 MMOL/L
AST SERPL-CCNC: 26 U/L
BASOPHILS # BLD AUTO: 0.06 K/UL
BASOPHILS NFR BLD AUTO: 0.7 %
BILIRUB SERPL-MCNC: 1.3 MG/DL
BUN SERPL-MCNC: 14 MG/DL
CALCIUM SERPL-MCNC: 10.4 MG/DL
CHLORIDE SERPL-SCNC: 101 MMOL/L
CHOLEST SERPL-MCNC: 214 MG/DL
CO2 SERPL-SCNC: 25 MMOL/L
CREAT SERPL-MCNC: 0.9 MG/DL
CREAT SPEC-SCNC: 104 MG/DL
EGFR: 94 ML/MIN/1.73M2
EOSINOPHIL # BLD AUTO: 0.21 K/UL
EOSINOPHIL NFR BLD AUTO: 2.6 %
GLUCOSE SERPL-MCNC: 104 MG/DL
HCT VFR BLD CALC: 46.4 %
HDLC SERPL-MCNC: 46 MG/DL
HGB BLD-MCNC: 15.2 G/DL
IMM GRANULOCYTES NFR BLD AUTO: 0.5 %
LDLC SERPL CALC-MCNC: 93 MG/DL
LYMPHOCYTES # BLD AUTO: 2.67 K/UL
LYMPHOCYTES NFR BLD AUTO: 32.6 %
MAN DIFF?: NORMAL
MCHC RBC-ENTMCNC: 29.8 PG
MCHC RBC-ENTMCNC: 32.8 GM/DL
MCV RBC AUTO: 91 FL
MICROALBUMIN 24H UR DL<=1MG/L-MCNC: <1.2 MG/DL
MICROALBUMIN/CREAT 24H UR-RTO: NORMAL MG/G
MONOCYTES # BLD AUTO: 0.98 K/UL
MONOCYTES NFR BLD AUTO: 12 %
NEUTROPHILS # BLD AUTO: 4.24 K/UL
NEUTROPHILS NFR BLD AUTO: 51.6 %
NONHDLC SERPL-MCNC: 168 MG/DL
PLATELET # BLD AUTO: 273 K/UL
POTASSIUM SERPL-SCNC: 5 MMOL/L
PROT SERPL-MCNC: 7.6 G/DL
RBC # BLD: 5.1 M/UL
RBC # FLD: 13.7 %
SODIUM SERPL-SCNC: 138 MMOL/L
TRIGL SERPL-MCNC: 378 MG/DL
WBC # FLD AUTO: 8.2 K/UL

## 2022-11-14 PROBLEM — K76.0 HEPATIC STEATOSIS: Status: ACTIVE | Noted: 2019-02-26

## 2022-11-14 NOTE — HISTORY OF PRESENT ILLNESS
[FreeTextEntry1] : CPE [de-identified] : 66yoM T2DM, HLD, Hepatic steatosis presenting for CPE \par \par #T2DM \par At prior visit he had stopped taking jardiance due to side effects. Stopped taking the januvia after one week (back in July) due to blurry vision, nausea, wanting to vomit, motion sickness. Felt better after that Continued with taking the metformin, made significant diet changes. Has been eating much more vegetables and drinking water. Avoiding sugary stuff, Started taking a living bitters, a detox supplement from [place] that he received from his nephew in [place]. One spoonful every other day. Also takes peach/cranberry water rarely. Avoids sodas. Rarely eats steak.  Eats mostly fish. Chicken and beef soups. Eats white rice, whole wheat bread. Has been spacing out meals and avoiding over eating and eats lightly throughout the day. No other supplements. Active with work:  odd jobs, although no other exercise. Follows with an ophthalmologist yearly. \par \par #nocturia \par Wakes up in evening to urinate, likely related to drinking a lot of water. Patient not concerned.  \par \par #Hepatic steatosis \par Diagnosed on CT in 2019, patient unaware of diagnosis, has taken statin back in march with normal LFTs and non-concerning lipid profile.

## 2022-11-14 NOTE — PLAN
[FreeTextEntry1] : #Hepatic steatosis \par Instructed patient to continue with statin\par Non-invasive assessment w/ Fibroscan with elastography \par Would need monitoring on q4year basis. \par \par #HCM \par Vaccinations: Flu vaccine and Shingrix \par RTC: 5 weeks for lipid checks \par \par #T2DM/ elevated ASCVD risk: \par c/w Atorvastatin 20, Asa 81, Metformin 1000 qD \par consider adding an alternative DPP4 inhibitor at the next visit if A1C remains above 7.

## 2022-11-15 DIAGNOSIS — E11.9 TYPE 2 DIABETES MELLITUS WITHOUT COMPLICATIONS: ICD-10-CM

## 2022-11-15 DIAGNOSIS — Z23 ENCOUNTER FOR IMMUNIZATION: ICD-10-CM

## 2022-11-15 DIAGNOSIS — Z00.00 ENCOUNTER FOR GENERAL ADULT MEDICAL EXAMINATION WITHOUT ABNORMAL FINDINGS: ICD-10-CM

## 2022-11-15 DIAGNOSIS — E78.5 HYPERLIPIDEMIA, UNSPECIFIED: ICD-10-CM

## 2023-02-10 ENCOUNTER — NON-APPOINTMENT (OUTPATIENT)
Age: 68
End: 2023-02-10

## 2023-02-10 ENCOUNTER — APPOINTMENT (OUTPATIENT)
Dept: OPHTHALMOLOGY | Facility: CLINIC | Age: 68
End: 2023-02-10
Payer: MEDICARE

## 2023-02-10 PROCEDURE — 92014 COMPRE OPH EXAM EST PT 1/>: CPT

## 2023-07-16 ENCOUNTER — APPOINTMENT (OUTPATIENT)
Dept: CARE COORDINATION | Facility: HOME HEALTH | Age: 68
End: 2023-07-16

## 2023-11-06 ENCOUNTER — APPOINTMENT (OUTPATIENT)
Dept: INTERNAL MEDICINE | Facility: CLINIC | Age: 68
End: 2023-11-06
Payer: MEDICARE

## 2023-11-06 ENCOUNTER — OUTPATIENT (OUTPATIENT)
Dept: OUTPATIENT SERVICES | Facility: HOSPITAL | Age: 68
LOS: 1 days | End: 2023-11-06
Payer: MEDICARE

## 2023-11-06 ENCOUNTER — MED ADMIN CHARGE (OUTPATIENT)
Age: 68
End: 2023-11-06

## 2023-11-06 VITALS
HEART RATE: 67 BPM | SYSTOLIC BLOOD PRESSURE: 126 MMHG | DIASTOLIC BLOOD PRESSURE: 70 MMHG | WEIGHT: 127 LBS | HEIGHT: 64 IN | OXYGEN SATURATION: 99 % | BODY MASS INDEX: 21.68 KG/M2

## 2023-11-06 DIAGNOSIS — I10 ESSENTIAL (PRIMARY) HYPERTENSION: ICD-10-CM

## 2023-11-06 DIAGNOSIS — Z90.49 ACQUIRED ABSENCE OF OTHER SPECIFIED PARTS OF DIGESTIVE TRACT: Chronic | ICD-10-CM

## 2023-11-06 DIAGNOSIS — Z00.00 ENCOUNTER FOR GENERAL ADULT MEDICAL EXAMINATION W/OUT ABNORMAL FINDINGS: ICD-10-CM

## 2023-11-06 PROCEDURE — 99203 OFFICE O/P NEW LOW 30 MIN: CPT

## 2023-11-06 RX ORDER — METFORMIN HYDROCHLORIDE 1000 MG/1
1000 TABLET, COATED ORAL
Qty: 180 | Refills: 3 | Status: COMPLETED | COMMUNITY
Start: 2017-03-24 | End: 2023-11-06

## 2023-11-06 RX ORDER — SITAGLIPTIN 50 MG/1
50 TABLET, FILM COATED ORAL DAILY
Qty: 90 | Refills: 0 | Status: DISCONTINUED | COMMUNITY
Start: 2022-07-22 | End: 2023-11-06

## 2023-11-06 RX ORDER — BLOOD-GLUCOSE METER
W/DEVICE KIT MISCELLANEOUS
Qty: 1 | Refills: 0 | Status: ACTIVE | COMMUNITY
Start: 2023-11-06 | End: 1900-01-01

## 2023-11-06 RX ORDER — LANCETS 28 GAUGE
EACH MISCELLANEOUS
Qty: 120 | Refills: 3 | Status: ACTIVE | COMMUNITY
Start: 2022-03-07 | End: 1900-01-01

## 2023-11-06 RX ORDER — ASPIRIN ENTERIC COATED TABLETS 81 MG 81 MG/1
81 TABLET, DELAYED RELEASE ORAL
Qty: 1 | Refills: 0 | Status: DISCONTINUED | COMMUNITY
Start: 2017-10-20 | End: 2023-11-06

## 2023-11-06 RX ORDER — FLASH GLUCOSE SCANNING READER
EACH MISCELLANEOUS
Qty: 1 | Refills: 30 | Status: ACTIVE | COMMUNITY
Start: 2021-11-05 | End: 1900-01-01

## 2023-11-06 RX ORDER — FLASH GLUCOSE SENSOR
KIT MISCELLANEOUS
Qty: 2 | Refills: 11 | Status: ACTIVE | COMMUNITY
Start: 2021-11-05 | End: 1900-01-01

## 2023-11-07 LAB
CREAT SPEC-SCNC: 73 MG/DL
HBA1C MFR BLD HPLC: 9.7
MICROALBUMIN 24H UR DL<=1MG/L-MCNC: <1.2 MG/DL
MICROALBUMIN/CREAT 24H UR-RTO: NORMAL MG/G

## 2023-11-07 PROCEDURE — 85027 COMPLETE CBC AUTOMATED: CPT

## 2023-11-07 PROCEDURE — G0463: CPT

## 2023-11-07 PROCEDURE — 82043 UR ALBUMIN QUANTITATIVE: CPT

## 2023-11-07 PROCEDURE — 80061 LIPID PANEL: CPT

## 2023-11-07 PROCEDURE — 83036 HEMOGLOBIN GLYCOSYLATED A1C: CPT

## 2023-11-07 PROCEDURE — 80053 COMPREHEN METABOLIC PANEL: CPT

## 2023-11-10 LAB
ALBUMIN SERPL ELPH-MCNC: 4.7 G/DL
ALP BLD-CCNC: 90 U/L
ALT SERPL-CCNC: 24 U/L
ANION GAP SERPL CALC-SCNC: 14 MMOL/L
AST SERPL-CCNC: 14 U/L
BILIRUB SERPL-MCNC: 1.9 MG/DL
BUN SERPL-MCNC: 18 MG/DL
CALCIUM SERPL-MCNC: 10.2 MG/DL
CHLORIDE SERPL-SCNC: 99 MMOL/L
CHOLEST SERPL-MCNC: 125 MG/DL
CO2 SERPL-SCNC: 26 MMOL/L
CREAT SERPL-MCNC: 0.91 MG/DL
EGFR: 92 ML/MIN/1.73M2
GLUCOSE SERPL-MCNC: 208 MG/DL
HCT VFR BLD CALC: 50.3 %
HDLC SERPL-MCNC: 43 MG/DL
HGB BLD-MCNC: 16.4 G/DL
LDLC SERPL CALC-MCNC: 59 MG/DL
MCHC RBC-ENTMCNC: 28.8 PG
MCHC RBC-ENTMCNC: 32.6 GM/DL
MCV RBC AUTO: 88.4 FL
NONHDLC SERPL-MCNC: 81 MG/DL
PLATELET # BLD AUTO: 278 K/UL
POTASSIUM SERPL-SCNC: 4.7 MMOL/L
PROT SERPL-MCNC: 7.6 G/DL
RBC # BLD: 5.69 M/UL
RBC # FLD: 12.9 %
SODIUM SERPL-SCNC: 139 MMOL/L
TRIGL SERPL-MCNC: 128 MG/DL
WBC # FLD AUTO: 6.85 K/UL

## 2023-11-10 RX ORDER — METFORMIN ER 500 MG 500 MG/1
500 TABLET ORAL DAILY
Qty: 60 | Refills: 3 | Status: ACTIVE | COMMUNITY
Start: 2023-11-06 | End: 1900-01-01

## 2023-11-13 DIAGNOSIS — E11.9 TYPE 2 DIABETES MELLITUS WITHOUT COMPLICATIONS: ICD-10-CM

## 2023-11-13 DIAGNOSIS — Z00.00 ENCOUNTER FOR GENERAL ADULT MEDICAL EXAMINATION WITHOUT ABNORMAL FINDINGS: ICD-10-CM

## 2024-03-01 ENCOUNTER — NON-APPOINTMENT (OUTPATIENT)
Age: 69
End: 2024-03-01

## 2024-03-01 ENCOUNTER — APPOINTMENT (OUTPATIENT)
Dept: OPHTHALMOLOGY | Facility: CLINIC | Age: 69
End: 2024-03-01
Payer: MEDICARE

## 2024-03-01 PROCEDURE — 99213 OFFICE O/P EST LOW 20 MIN: CPT

## 2024-03-28 ENCOUNTER — APPOINTMENT (OUTPATIENT)
Dept: OPHTHALMOLOGY | Facility: CLINIC | Age: 69
End: 2024-03-28
Payer: MEDICARE

## 2024-03-28 ENCOUNTER — NON-APPOINTMENT (OUTPATIENT)
Age: 69
End: 2024-03-28

## 2024-03-28 PROCEDURE — 92014 COMPRE OPH EXAM EST PT 1/>: CPT

## 2024-04-18 NOTE — ED ADULT NURSE NOTE - DOES PATIENT HAVE ADVANCE DIRECTIVE
Subjective:     Patient ID: Yung Mcconnell is a 70 y.o. male.    Chief Complaint:  Patient reports cardiac pulse was above 130 , chest was tight     HPI    He   presents for evaluation and treatment of A. Flutter  after being discharged from the hospital  3  weeks ago. Since discharge symptoms have gradually improving course since that time. Patient denies chest pain . Symptoms are aggravated by activity. Symptoms improve with rest.  Respiratory: positive for cough, dyspnea on exertion, sputum, and wheezing; Cardiovascular: positive for fatigue and palpitations.  Patient currently is not on home oxygen therapy..    MEDICAL RECORDS FROM THE HOSPITAL REVIEWED:  [facility] ([facility])  [facility]  Discharge Summary        Patient Name: Yung Mcconnell  MRN: [id number]  ISAURA: [id number]  Patient Class: IP- Inpatient  Admission Date: 3/31/2024  Hospital Length of Stay: 1 days  Discharge Date and Time:  04/02/2024 11:24 AM  Attending Physician: Ricardo Good MD   Discharging Provider: Ricardo Good MD  Primary Care Provider: Prasanna Gaxiola MD     Primary Care Team: Networked reference to record PCT      HPI:   Patient is a 70-year-old male with past medical history of asthma, COPD, GERD, arthritis, obstructive sleep apnea on CPAP, hyperlipidemia with intolerance to statins, and seasonal allergies who presented to ED with complaints of rapid heart rate noted on apple watch, chest tightness, and shortness of breath.  He reports that he has had some shortness of breath over the past week, however he gets short of breath from time to time with his asthma/COPD said this was not out of the ordinary.  He states that he has never been evaluated by a cardiologist in the past.  He also reported feeling some lightheadedness.  He denies syncope, headache, dizziness, ear pain/fullness, visual changes, abdominal pain, nausea, vomiting, diarrhea, constipation, dysuria, fever, chills.  He reports that the chest tightness  that occurred has resolved and he has no pain at this time.  He does not feel short of breath at rest.  Blood pressure stable, heart rate up to 130s.  Lab workup shows troponin 0.031 followed by repeat troponin 0.022, .  EKG shows atrial flutter with 2-1 AV conduction, ST and T-wave abnormality.  He was given IV Cardizem and started on Cardizem drip which is currently at 15 milligrams/hour as well as heparin drip.  Cardiology was consulted per ED, also recommended IV fluids and echo in a.[facility] Medicine was consulted for admission due to new onset atrial flutter with rapid ventricular rate.      a 70 y.o. male presenting to the ED for SOB over the past week with chest tightness and a rapid HB onsetting 2 hours pta. Pt has a hx of Afib and a collapsed lung from 3 years ago. Symptoms are constant and moderate in severity. No mitigating or exacerbating factors reported. Associated sxs include lightheadedness, a headache, sore throat, and wheezing which subsided earlier this week. Patient denies any nausea, vomiting, diaphoresis, bloody stool, black tarry stool, and all other sxs at this time. No further complaints or concerns at this time.         Procedure(s) (LRB):  Transesophageal echo (CHRIS) intra-procedure log documentation (N/A)       Hospital Course:   4/1  Admitted for a-flutter/RVR  On cardizem and heparin drips on admission  Underwent CHRIS/DCCV this afternoon with Dr. Corley  Post-procedure, started on amiodarone, metoprolol  Heparin drip stopped, started on apixaban     4/2  NAEON,in sinus rhythm  Denies complaints  Stable for discharge home  Prescribed amiodarone, metoprolol and apixaban  F/U with cardiology in 1-2 weeks      Goals of Care Treatment Preferences:  Code Status: Full Code        Consults:   Consults (From admission, onward)            Status Ordering Provider       Inpatient consult to Cardiology  Once        Provider:  Yesica Saba MD    Completed DALLAS SONI                 No new Assessment & Plan notes have been filed under this hospital service since the last note was generated.  Service: Hospital Medicine           Final Active Diagnoses:     Diagnosis Date Noted POA    PRINCIPAL PROBLEM:  Atrial flutter with rapid ventricular response [I48.92] 2024 Yes    Statin intolerance [Z78.9] 2019 Yes    Seasonal allergic rhinitis [J30.2] 2017 Yes    Asthma with COPD [J44.89] 2017 Yes    KEVEN (obstructive sleep apnea) [G47.33] 2013 Yes       Problems Resolved During this Admission:         Discharged Condition: stable     Disposition: Home or Self Care     Follow Up:    Follow-up Information         Prasanna Gaxiola MD Follow up.    Specialty: Family Medicine  Why: As needed  Contact information:  [address] [place] [address] [phone]                    Simran Corley MD. Schedule an appointment as soon as possible for a visit in 1 week(s).    Specialties: Interventional Cardiology, Cardiology  Why: Hospital discharge follow up  Contact information:  [id number] [place] [address] [phone]                             Obstructive Sleep Apnea on Continuous Positive Airway Pressure:  Patient is using CPAP as prescribed and benefiting from therapy. Patient has complaints of dry mouth  03/10/2024 - 2024  Patient ID: [id number]  : 1953  Age: 70 years  Gender: Male  [facility] [address] [place]  [place], [address]  Compliance Report  Compliance  Payor Standard  Usage 03/10/2024 - 2024  Usage days 29/30 days (97%)  >= 4 hours 29 days (97%)  < 4 hours 0 days (0%)  Usage hours 207 hours 1 minutes  Average usage (total days) 6 hours 54 minutes  Average usage (days used) 7 hours 8 minutes  Median usage (days used) 6 hours 56 minutes  Total used hours (value since last reset - 2024) 3,929 hours  AirSense 11 AutoSet  Serial number [id number]  Mode CPAP  Set pressure 11 cmH2O  EPR  Fulltime  EPR level 3  Therapy  Leaks - L/min Median: 0.0 95th percentile: 10.3 Maximum: 34.1  Events per hour AI: 1.3 HI: 0.3 AHI: 1.6  Apnea Index Central: 0.4 Obstructive: 0.8 Unknown: 0.0  RERA Index 0.4  Cheyne-Haley respiration (average duration per night) 0 minutes (0%)  Usage - hours  Printed      History of asbestos exposure 1977- 1983 - [employer]      Asthma Follow-up  The patient has previously been evaluated here for asthma and presents for an asthma follow-up. The patient is currently having symptoms / an exacerbation. Current symptoms include dyspnea, non-productive cough, and wheezing. Symptoms have been present since several days ago and have been rapidly worsening. He denies chest tightness, dyspnea, productive cough, and wheezing. Associated symptoms include fatigue and poor exercise tolerance.  This episode appears to have been triggered by pollens. Treatments tried for the current exacerbation include inhaled corticosteroids, long-acting inhaled beta-adrenergic agonists, and short-acting inhaled beta-adrenergic agonists, which have provided some relief of symptoms. The patient has been having similar episodes for approximately  many  years.    Current Disease Severity  The patient is having daytime symptoms throughout the day. The patient is having daytime symptoms often 7 times per week. The patient is using short-acting beta agonists for symptom control several times per day. He has exacerbations requiring oral systemic corticosteroids 2 times per year. Current limitations in activity from asthma:  unable to exercise . Number of days of school or work missed in the last month: not applicable. Number of urgent/emergent visit in last year: 0.  The patient is using a spacer with MDIs. His best peak flow rate is na. He is not monitoring peak flow rates at home.    Past Medical History:   Diagnosis Date    Allergy     Asthma, chronic 7/9/2013    Colon polyps     COPD (chronic obstructive pulmonary  disease)     GERD (gastroesophageal reflux disease)     High cholesterol     Osteoarthritis of both knees 7/9/2013    Sleep apnea      Past Surgical History:   Procedure Laterality Date    CHOLECYSTECTOMY      COLONOSCOPY N/A 10/23/2020    Procedure: COLONOSCOPY;  Surgeon: Sae Valentine MD;  Location: [facility] ENDO;  Service: Endoscopy;  Laterality: N/A;    DIAGNOSTIC LAPAROSCOPY N/A 6/21/2018    Procedure: LAPAROSCOPY, DIAGNOSTIC;  Surgeon: Casper Martinez MD;  Location: [facility] OR;  Service: General;  Laterality: N/A;    LUNG SURGERY Right     benign mass    TOTAL KNEE ARTHROPLASTY Left     TRANSESOPHAGEAL ECHOCARDIOGRAM WITH POSSIBLE CARDIOVERSION (CHRIS W/ POSS CARDIOVERSION) N/A 4/1/2024    Procedure: Transesophageal echo (CHRIS) intra-procedure log documentation;  Surgeon: Simran Corley MD;  Location: [facility] CATH LAB;  Service: Cardiology;  Laterality: N/A;    UMBILICAL HERNIA REPAIR N/A 6/21/2018    Procedure: REPAIR, HERNIA, UMBILICAL, AGE 5 YEARS OR OLDER;  Surgeon: Casper Martinez MD;  Location: [facility] OR;  Service: General;  Laterality: N/A;     Review of patient's allergies indicates:   Allergen Reactions    Zithromax [azithromycin] Palpitations    Aspirin Other (See Comments)     Other reaction(s): Difficulty breathing    Lipitor [atorvastatin] Other (See Comments)     Leg cramps/hand cramps     Current Outpatient Medications on File Prior to Visit   Medication Sig Dispense Refill    albuterol (PROVENTIL/VENTOLIN HFA) 90 mcg/actuation inhaler Inhale 2 puffs into the lungs every 4 (four) hours as needed for Wheezing or Shortness of Breath. 54 g 3    amiodarone (PACERONE) 200 MG Tab Take 1 tablet (200 mg total) by mouth 2 (two) times daily. 60 tablet 11    apixaban (ELIQUIS) 5 mg Tab Take 1 tablet (5 mg total) by mouth 2 (two) times daily. 60 tablet 5    fluticasone propionate (FLONASE) 50 mcg/actuation nasal spray 2 sprays (100 mcg total) by Each Nostril route once daily. 48 g 3    metoprolol succinate  (TOPROL-XL) 25 MG 24 hr tablet Take 1 tablet (25 mg total) by mouth once daily. 30 tablet 11    tadalafiL (CIALIS) 20 MG Tab TAKE 1 TABLET DAILY AS NEEDED 24 tablet 2    [DISCONTINUED] apixaban (ELIQUIS) 5 mg Tab Take 5 mg by mouth 2 (two) times daily.      [DISCONTINUED] metoprolol tartrate (LOPRESSOR) 25 MG tablet Take 25 mg by mouth once daily.       No current facility-administered medications on file prior to visit.     Social History     Socioeconomic History    Marital status:    Occupational History     Employer: [employer]   Tobacco Use    Smoking status: Never     Passive exposure: Past    Smokeless tobacco: Never   Substance and Sexual Activity    Alcohol use: Never    Drug use: No    Sexual activity: Yes     Partners: Female     Social Determinants of Health     Financial Resource Strain: Medium Risk (4/15/2024)    Overall Financial Resource Strain (CARDIA)     Difficulty of Paying Living Expenses: Somewhat hard   Food Insecurity: No Food Insecurity (4/15/2024)    Hunger Vital Sign     Worried About Running Out of Food in the Last Year: Never true     Ran Out of Food in the Last Year: Never true   Transportation Needs: No Transportation Needs (4/15/2024)    PRAPARE - Transportation     Lack of Transportation (Medical): No     Lack of Transportation (Non-Medical): No   Physical Activity: Sufficiently Active (4/15/2024)    Exercise Vital Sign     Days of Exercise per Week: 3 days     Minutes of Exercise per Session: 50 min   Stress: No Stress Concern Present (4/15/2024)    [employer] [place] [employer] - Occupational Stress Questionnaire     Feeling of Stress : Only a little   Social Connections: Moderately Integrated (4/15/2024)    Social Connection and Isolation Panel [NHANES]     Frequency of Communication with Friends and Family: More than three times a week     Frequency of Social Gatherings with Friends and Family: Once a week     Attends Yarsani Services: More than  "4 times per year     Active Member of Clubs or Organizations: No     Attends Club or Organization Meetings: Never     Marital Status:    Housing Stability: Low Risk  (4/15/2024)    Housing Stability Vital Sign     Unable to Pay for Housing in the Last Year: No     Number of Times Moved in the Last Year: 1     Homeless in the Last Year: No     Family History   Problem Relation Name Age of Onset    Hypertension Mother      Diabetes Mother      Cancer Mother          Breast Ca     Heart disease Father          CAD , CHF     Aneurysm Father      Colon polyps Father      Melanoma Neg Hx      Eczema Neg Hx      Lupus Neg Hx      Psoriasis Neg Hx         Review of Systems   Constitutional:  Positive for fatigue. Negative for fever.   HENT:  Positive for postnasal drip, rhinorrhea and congestion.    Eyes:  Negative for redness and itching.   Respiratory:  Positive for cough, sputum production, shortness of breath, dyspnea on extertion, use of rescue inhaler and Paroxysmal Nocturnal Dyspnea.    Cardiovascular:  Negative for chest pain, palpitations and leg swelling.   Genitourinary:  Negative for difficulty urinating and hematuria.   Endocrine:  Negative for cold intolerance and heat intolerance.    Skin:  Negative for rash.   Gastrointestinal:  Negative for nausea and abdominal pain.   Neurological:  Negative for dizziness, syncope, weakness and light-headedness.   Hematological:  Negative for adenopathy. Does not bruise/bleed easily.   Psychiatric/Behavioral:  Negative for sleep disturbance. The patient is not nervous/anxious.        Objective:      /80   Pulse (!) 55   Resp 18   Ht 6' 2" (1.88 m)   Wt 125.8 kg (277 lb 5.4 oz)   SpO2 95%   BMI 35.61 kg/m²   Physical Exam  Vitals and nursing note reviewed.   Constitutional:       Appearance: He is well-developed.   HENT:      Head: Normocephalic and atraumatic.      Nose: Congestion and rhinorrhea present.   Eyes:      Conjunctiva/sclera: Conjunctivae " normal.      Pupils: Pupils are equal, round, and reactive to light.   Neck:      Thyroid: No thyromegaly.      Vascular: No JVD.      Trachea: No tracheal deviation.   Cardiovascular:      Rate and Rhythm: Normal rate and regular rhythm.      Heart sounds: No murmur heard.  Pulmonary:      Breath sounds: Examination of the right-lower field reveals wheezing. Examination of the left-lower field reveals wheezing. Decreased breath sounds and wheezing present. No rhonchi or rales.   Abdominal:      General: Bowel sounds are normal.      Palpations: Abdomen is soft.   Musculoskeletal:         General: No tenderness. Normal range of motion.      Cervical back: Neck supple.   Lymphadenopathy:      Cervical: No cervical adenopathy.   Skin:     General: Skin is warm and dry.   Neurological:      Mental Status: He is alert and oriented to person, place, and time.       Personal Diagnostic Review  Chest x-ray: hyperinflation          4/19/2024     8:59 AM   Pulmonary Studies Review   SpO2 97 %   Weight 126.1 kg (278 lb)   BMI (Calculated) 35.7   Predicted Distance 300.92       Intra-Procedure Documentation    The estimated blood loss was none.    Successful CHRIS cardioversion  Transesophageal echo (CHRIS) with possible cardioversion    Left Ventricle: There is normal systolic function with a visually   estimated ejection fraction of 60 - 65%. Unable to assess diastolic   function due to atrial fibrillation.    Right Ventricle: Normal right ventricular cavity size. Wall thickness   is normal. Right ventricle wall motion  is normal. Systolic function is   normal.    Left Atrium: The left atrial appendage appears normal. There is no   thrombus in the cavity.    Mitral Valve: There is mild regurgitation.    Tricuspid Valve: There is mild regurgitation.    IVC/SVC: Normal venous pressure at 3 mmHg.  Echo    Left Ventricle: There is normal systolic function with a visually   estimated ejection fraction of 60 - 65%. Unable to assess  "diastolic   function due to atrial flutter    Right Ventricle: Normal right ventricular cavity size. Wall thickness   is normal. Right ventricle wall motion  is normal. Systolic function is   normal.    IVC/SVC: Normal venous pressure at 3 mmHg.      Office Spirometry Results:         4/19/2024     8:59 AM 4/18/2024    11:02 AM 4/5/2024     3:33 PM 4/2/2024     7:35 AM 4/2/2024     7:17 AM 4/2/2024     5:19 AM 4/2/2024    12:41 AM   Pulmonary Function Tests   SpO2 97 % 95 % 95 % 99 % 96 % 95 % 96 %   Height  6' 2" (1.88 m) 6' 2" (1.88 m)       Weight 126.1 kg (278 lb) 125.8 kg (277 lb 5.4 oz) 126.5 kg (278 lb 12.4 oz)       BMI (Calculated) 35.7 35.6 35.8             4/19/2024     8:59 AM   Pulmonary Studies Review   SpO2 97 %   Weight 126.1 kg (278 lb)   BMI (Calculated) 35.7   Predicted Distance 300.92         Assessment:            KEVEN on CPAP  -     PULSE OXIMETRY OVERNIGHT; Future; Expected date: 04/18/2024    Asthma with COPD  -     Discontinue: levalbuterol (XOPENEX) 1.25 mg/3 mL nebulizer solution; Take 3 mLs (1.25 mg total) by nebulization every 6 to 8 hours as needed for Wheezing or Shortness of Breath.  Dispense: 288 each; Refill: 11  -     Discontinue: fluticasone-salmeterol diskus inhaler 250-50 mcg; Inhale 1 puff into the lungs 2 (two) times daily. Controller  Dispense: 60 each; Refill: 3    Nocturnal hypoxemia  -     PULSE OXIMETRY OVERNIGHT; Future; Expected date: 04/18/2024    Asthma, chronic, moderate persistent, uncomplicated    Mild intermittent asthma, uncomplicated    Mild persistent asthma with acute exacerbation  -     doxycycline (VIBRAMYCIN) 100 MG Cap; Take 1 capsule (100 mg total) by mouth every 12 (twelve) hours.  Dispense: 20 capsule; Refill: 0  -     predniSONE (DELTASONE) 20 MG tablet; Prednisone 60 mg/ day for 3 days, 40 mg/day for 3 days,20 mg/ day for 3 days, (1/2 tablet )10 mg a day for 3 days.  Dispense: 20 tablet; Refill: 0    Seasonal allergic rhinitis due to pollen          "   Outpatient Encounter Medications as of 4/18/2024   Medication Sig Dispense Refill    albuterol (PROVENTIL/VENTOLIN HFA) 90 mcg/actuation inhaler Inhale 2 puffs into the lungs every 4 (four) hours as needed for Wheezing or Shortness of Breath. 54 g 3    amiodarone (PACERONE) 200 MG Tab Take 1 tablet (200 mg total) by mouth 2 (two) times daily. 60 tablet 11    apixaban (ELIQUIS) 5 mg Tab Take 1 tablet (5 mg total) by mouth 2 (two) times daily. 60 tablet 5    doxycycline (VIBRAMYCIN) 100 MG Cap Take 1 capsule (100 mg total) by mouth every 12 (twelve) hours. 20 capsule 0    fluticasone propionate (FLONASE) 50 mcg/actuation nasal spray 2 sprays (100 mcg total) by Each Nostril route once daily. 48 g 3    metoprolol succinate (TOPROL-XL) 25 MG 24 hr tablet Take 1 tablet (25 mg total) by mouth once daily. 30 tablet 11    predniSONE (DELTASONE) 20 MG tablet Prednisone 60 mg/ day for 3 days, 40 mg/day for 3 days,20 mg/ day for 3 days, (1/2 tablet )10 mg a day for 3 days. 20 tablet 0    tadalafiL (CIALIS) 20 MG Tab TAKE 1 TABLET DAILY AS NEEDED 24 tablet 2    [DISCONTINUED] albuterol (PROVENTIL) 2.5 mg /3 mL (0.083 %) nebulizer solution Take 3 mLs (2.5 mg total) by nebulization every 4 to 6 hours as needed for Wheezing or Shortness of Breath. 360 mL 11    [DISCONTINUED] apixaban (ELIQUIS) 5 mg Tab Take 5 mg by mouth 2 (two) times daily.      [DISCONTINUED] azelaic acid (AZELEX) 15 % gel Apply 1 application topically 2 (two) times a day.      [DISCONTINUED] cetirizine (ZYRTEC) 10 MG tablet Take 10 mg by mouth once daily.      [DISCONTINUED] cyclobenzaprine (FLEXERIL) 5 MG tablet 1 Tablet Oral Three times a day as needed. 20 tablet 0    [DISCONTINUED] diclofenac 1.3 % PT12 Apply 1 patch topically every 12 (twelve) hours as needed. 30 patch 0    [DISCONTINUED] doxycycline (MONODOX) 100 MG capsule Take 1 capsule (100 mg total) by mouth every 12 (twelve) hours. 20 capsule 1    [DISCONTINUED] fluticasone-salmeterol diskus inhaler  No 250-50 mcg Inhale 1 puff into the lungs 2 (two) times daily. Controller 60 each 3    [DISCONTINUED] fluticasone-umeclidin-vilanter (TRELEGY ELLIPTA) 200-62.5-25 mcg inhaler Inhale 1 puff into the lungs once daily. Wash out mouth after use (Patient not taking: Reported on 4/18/2024) 180 each 3    [DISCONTINUED] GLUCOSAMINE HCL/CHONDR ALFONSO A NA (OSTEO BI-FLEX ORAL) Take 1 tablet by mouth once daily.       [DISCONTINUED] levalbuterol (XOPENEX) 1.25 mg/3 mL nebulizer solution Take 3 mLs (1.25 mg total) by nebulization every 6 to 8 hours as needed for Wheezing or Shortness of Breath. 288 each 11    [DISCONTINUED] metoprolol tartrate (LOPRESSOR) 25 MG tablet Take 25 mg by mouth once daily.      [DISCONTINUED] metroNIDAZOLE (METROGEL) 0.75 % gel Apply topically 2 (two) times daily. 45 g 2    [DISCONTINUED] multivitamin (THERAGRAN) per tablet 1 Tablet Oral Every day      [DISCONTINUED] predniSONE (DELTASONE) 20 MG tablet Prednisone 60 mg/ day for 3 days, 40 mg/day for 3 days,20 mg/ day for 3 days, (1/2 tablet )10 mg a day for 3 days. 20 tablet 0    [DISCONTINUED] semaglutide, weight loss, (WEGOVY) 0.25 mg/0.5 mL PnIj Inject 0.5 mg into the skin once a week. 0.5 mL 8    [DISCONTINUED] acetaminophen tablet 650 mg       [DISCONTINUED] acetaminophen tablet 650 mg       [DISCONTINUED] aluminum-magnesium hydroxide-simethicone 200-200-20 mg/5 mL suspension 30 mL       [DISCONTINUED] amiodarone tablet 200 mg       [DISCONTINUED] apixaban tablet 5 mg       [DISCONTINUED] arformoteroL nebulizer solution 15 mcg       [DISCONTINUED] budesonide nebulizer solution 0.5 mg       [DISCONTINUED] dextrose 10% bolus 125 mL 125 mL       [DISCONTINUED] dextrose 10% bolus 250 mL 250 mL       [DISCONTINUED] dextrose 5 % and 0.45 % NaCl infusion       [DISCONTINUED] diltiaZEM 125 mg in dextrose 5% 125 mL infusion (non-titrating)       [DISCONTINUED] fluticasone propionate 50 mcg/actuation nasal spray 100 mcg       [DISCONTINUED] glucagon (human  recombinant) injection 1 mg       [DISCONTINUED] glucose chewable tablet 16 g       [DISCONTINUED] glucose chewable tablet 24 g       [DISCONTINUED] heparin 25,000 units in dextrose 5% (100 units/ml) IV bolus from bag LOW INTENSITY nomogram - [facility]       [DISCONTINUED] heparin 25,000 units in dextrose 5% (100 units/ml) IV bolus from bag LOW INTENSITY nomogram - [facility]       [DISCONTINUED] heparin 25,000 units in dextrose 5% 250 mL (100 units/mL) infusion LOW INTENSITY nomogram - [facility]       [DISCONTINUED] ipratropium 0.02 % nebulizer solution 0.5 mg       [DISCONTINUED] levalbuterol nebulizer solution 1.2495 mg       [DISCONTINUED] melatonin tablet 6 mg       [DISCONTINUED] metoprolol injection 5 mg       [DISCONTINUED] metoprolol succinate (TOPROL-XL) 24 hr tablet 25 mg       [DISCONTINUED] naloxone 0.4 mg/mL injection 0.02 mg       [DISCONTINUED] ondansetron injection 4 mg       [DISCONTINUED] polyethylene glycol packet 17 g       [DISCONTINUED] promethazine tablet 25 mg       [DISCONTINUED] sodium chloride 0.9% flush 3 mL        No facility-administered encounter medications on file as of 4/18/2024.     Plan:       Requested Prescriptions     Signed Prescriptions Disp Refills    doxycycline (VIBRAMYCIN) 100 MG Cap 20 capsule 0     Sig: Take 1 capsule (100 mg total) by mouth every 12 (twelve) hours.    predniSONE (DELTASONE) 20 MG tablet 20 tablet 0     Sig: Prednisone 60 mg/ day for 3 days, 40 mg/day for 3 days,20 mg/ day for 3 days, (1/2 tablet )10 mg a day for 3 days.     Problem List Items Addressed This Visit       Seasonal allergic rhinitis due to pollen    Asthma with COPD     Other Visit Diagnoses       KEVEN on CPAP    -  Primary    Relevant Orders    PULSE OXIMETRY OVERNIGHT    Nocturnal hypoxemia        Relevant Orders    PULSE OXIMETRY OVERNIGHT    Asthma, chronic, moderate persistent, uncomplicated        Mild intermittent asthma, uncomplicated        Mild persistent asthma with acute exacerbation         Relevant Medications    doxycycline (VIBRAMYCIN) 100 MG Cap    predniSONE (DELTASONE) 20 MG tablet               Follow up in about 3 months (around 7/18/2024) for althea - on return, Overnight O2 Sat ASAP.    MEDICAL DECISION MAKING: Moderate to high complexity.  Overall, the multiple problems listed are of moderate to high severity that may impact quality of life and activities of daily living. Side effects of medications, treatment plan as well as options and alternatives reviewed and discussed with patient. There was counseling of patient concerning these issues.    Total time spent in counseling and coordination of care - 30  minutes of total time spent on the encounter, which includes face to face time and non-face to face time preparing to see the patient (eg, review of tests), Obtaining and/or reviewing separately obtained history, Documenting clinical information in the electronic or other health record, Independently interpreting results (not separately reported) and communicating results to the patient/family/caregiver, or Care coordination (not separately reported).    Time was used in discussion of prognosis, risks, benefits of treatment, instructions and compliance with regimen . Discussion with other physicians and/or health care providers - home health or for use of durable medical equipment (oxygen, nebulizers, CPAP, BiPAP) occurred.

## 2024-05-21 RX ORDER — ATORVASTATIN CALCIUM 20 MG/1
20 TABLET, FILM COATED ORAL DAILY
Qty: 90 | Refills: 3 | Status: ACTIVE | COMMUNITY
Start: 2021-04-26 | End: 1900-01-01

## 2024-05-23 ENCOUNTER — OUTPATIENT (OUTPATIENT)
Dept: OUTPATIENT SERVICES | Facility: HOSPITAL | Age: 69
LOS: 1 days | End: 2024-05-23
Payer: MEDICARE

## 2024-05-23 ENCOUNTER — APPOINTMENT (OUTPATIENT)
Dept: INTERNAL MEDICINE | Facility: CLINIC | Age: 69
End: 2024-05-23
Payer: MEDICARE

## 2024-05-23 VITALS
BODY MASS INDEX: 21.68 KG/M2 | DIASTOLIC BLOOD PRESSURE: 76 MMHG | HEIGHT: 64 IN | OXYGEN SATURATION: 98 % | SYSTOLIC BLOOD PRESSURE: 124 MMHG | HEART RATE: 73 BPM | WEIGHT: 127 LBS

## 2024-05-23 DIAGNOSIS — M19.241 SECONDARY OSTEOARTHRITIS, RIGHT HAND: ICD-10-CM

## 2024-05-23 DIAGNOSIS — I10 ESSENTIAL (PRIMARY) HYPERTENSION: ICD-10-CM

## 2024-05-23 DIAGNOSIS — M19.042 PRIMARY OSTEOARTHRITIS, RIGHT HAND: ICD-10-CM

## 2024-05-23 DIAGNOSIS — M19.242 SECONDARY OSTEOARTHRITIS, RIGHT HAND: ICD-10-CM

## 2024-05-23 DIAGNOSIS — E11.9 TYPE 2 DIABETES MELLITUS W/OUT COMPLICATIONS: ICD-10-CM

## 2024-05-23 DIAGNOSIS — Z90.49 ACQUIRED ABSENCE OF OTHER SPECIFIED PARTS OF DIGESTIVE TRACT: Chronic | ICD-10-CM

## 2024-05-23 DIAGNOSIS — Z12.11 ENCOUNTER FOR SCREENING FOR MALIGNANT NEOPLASM OF COLON: ICD-10-CM

## 2024-05-23 DIAGNOSIS — L98.9 DISORDER OF THE SKIN AND SUBCUTANEOUS TISSUE, UNSPECIFIED: ICD-10-CM

## 2024-05-23 DIAGNOSIS — M19.041 PRIMARY OSTEOARTHRITIS, RIGHT HAND: ICD-10-CM

## 2024-05-23 LAB — HBA1C MFR BLD HPLC: 10.8

## 2024-05-23 PROCEDURE — G0463: CPT

## 2024-05-23 PROCEDURE — 99213 OFFICE O/P EST LOW 20 MIN: CPT | Mod: GE,25

## 2024-05-23 PROCEDURE — 83036 HEMOGLOBIN GLYCOSYLATED A1C: CPT

## 2024-05-23 RX ORDER — REPAGLINIDE 0.5 MG/1
0.5 TABLET ORAL
Qty: 30 | Refills: 0 | Status: ACTIVE | COMMUNITY
Start: 2024-05-23 | End: 1900-01-01

## 2024-05-23 RX ORDER — SITAGLIPTIN 50 MG/1
50 TABLET, FILM COATED ORAL DAILY
Qty: 90 | Refills: 2 | Status: ACTIVE | COMMUNITY
Start: 2024-05-23 | End: 1900-01-01

## 2024-05-23 RX ORDER — ZOSTER VACCINE RECOMBINANT, ADJUVANTED 50 MCG/0.5
50 KIT INTRAMUSCULAR
Qty: 1 | Refills: 0 | Status: ACTIVE | COMMUNITY
Start: 2024-05-23 | End: 1900-01-01

## 2024-05-24 PROBLEM — Z12.11 SCREEN FOR COLON CANCER: Status: ACTIVE | Noted: 2018-05-04

## 2024-05-24 PROBLEM — M19.241 OTHER SECONDARY OSTEOARTHRITIS OF BOTH HANDS: Status: ACTIVE | Noted: 2024-05-24

## 2024-05-24 PROBLEM — M19.041 OSTEOARTHRITIS OF BOTH HANDS, UNSPECIFIED OSTEOARTHRITIS TYPE: Status: ACTIVE | Noted: 2024-05-24

## 2024-05-24 NOTE — HEALTH RISK ASSESSMENT
[0] : 2) Feeling down, depressed, or hopeless: Not at all (0) [PHQ-2 Negative - No further assessment needed] : PHQ-2 Negative - No further assessment needed [[id number]] : 0

## 2024-05-24 NOTE — HISTORY OF PRESENT ILLNESS
[de-identified] : 68M PMH T2DM, HLD, hepatic steatosis presenting for diabetes follow up.  #T2DM -A1c 9.7 in Nov -On metformin ER 1000mg qd, jardiance discontinued previously due to nausea -neg proteinuria 11/2023 -saw an optho 2 months ago, was told he has cataracts and needs follow up  #skin lesions -noticed multiple hyperpigmented skin lesions 2-3 months ago across L side of his chest -noticed all of them at once, has not grown in size, is not itchy and does not produce any fluids/pus -endorses slow weight loss over last several years, no family history of cancer, denies constipation  #hand pain -also starting 2-3 months ago, has been feeling fingers stiff and painful, jc after activity and at night -has not taken any medications  #HLD -On atorvastatin 20mg qd

## 2024-05-24 NOTE — PHYSICAL EXAM
[No Acute Distress] : no acute distress [Well Nourished] : well nourished [Well Developed] : well developed [Well-Appearing] : well-appearing [Normal Sclera/Conjunctiva] : normal sclera/conjunctiva [EOMI] : extraocular movements intact [Normal Outer Ear/Nose] : the outer ears and nose were normal in appearance [Normal Oropharynx] : the oropharynx was normal [No Lymphadenopathy] : no lymphadenopathy [Supple] : supple [No Respiratory Distress] : no respiratory distress  [No Accessory Muscle Use] : no accessory muscle use [Clear to Auscultation] : lungs were clear to auscultation bilaterally [Normal Rate] : normal rate  [Regular Rhythm] : with a regular rhythm [Normal S1, S2] : normal S1 and S2 [No Murmur] : no murmur heard [No Edema] : there was no peripheral edema [Soft] : abdomen soft [Non Tender] : non-tender [Non-distended] : non-distended [No HSM] : no HSM [Normal Bowel Sounds] : normal bowel sounds [No Spinal Tenderness] : no spinal tenderness [No Joint Swelling] : no joint swelling [Grossly Normal Strength/Tone] : grossly normal strength/tone [No Focal Deficits] : no focal deficits [Normal Gait] : normal gait [Normal Affect] : the affect was normal [Normal Insight/Judgement] : insight and judgment were intact [de-identified] : multiple hyperpigmented macules of differing sizes across L side of chest

## 2024-05-24 NOTE — ASSESSMENT
[FreeTextEntry1] : 68M PMH T2DM, HLD, hepatic steatosis presenting for diabetes follow up.  #T2DM -A1c 9.7 in Nov, 10.8 today -c/w metformin 1000mg ER qd  -Start prandin 0.5mg with biggest meal, dinner -Start jardiance 50mg qd -Glucometer given, pt states he knows how to use it, advised pt to check fasting am sugar and sugar 2 hrs post dinner -diabetes educator referral given  #skin lesions -noticed multiple hyperpigmented skin lesions 2-3 months ago across L side of his chest -derm referral given  #hand pain -also starting 2-3 months ago, has been feeling fingers stiff and painful, jc after activity and at night -has not taken any medications -likely osteoarthritis, rec tylenol  #HLD -c/w atorvastatin 20mg qd  #HCM -flu given 2023 -PPSV 2022, PV13 in 2020, prevnar in 2027 -tdap 2022 -shingles sent to pharmacy -colonoscopy script given  RTC 5 weeks for DM follow up Case discussed with Dr. Zamora

## 2024-05-28 DIAGNOSIS — L98.9 DISORDER OF THE SKIN AND SUBCUTANEOUS TISSUE, UNSPECIFIED: ICD-10-CM

## 2024-05-28 DIAGNOSIS — M19.042 PRIMARY OSTEOARTHRITIS, LEFT HAND: ICD-10-CM

## 2024-05-28 DIAGNOSIS — Z12.11 ENCOUNTER FOR SCREENING FOR MALIGNANT NEOPLASM OF COLON: ICD-10-CM

## 2024-05-28 DIAGNOSIS — E11.9 TYPE 2 DIABETES MELLITUS WITHOUT COMPLICATIONS: ICD-10-CM

## 2024-05-28 DIAGNOSIS — M19.041 PRIMARY OSTEOARTHRITIS, RIGHT HAND: ICD-10-CM

## 2024-06-24 ENCOUNTER — APPOINTMENT (OUTPATIENT)
Dept: OPHTHALMOLOGY | Facility: CLINIC | Age: 69
End: 2024-06-24
Payer: MEDICARE

## 2024-06-24 ENCOUNTER — NON-APPOINTMENT (OUTPATIENT)
Age: 69
End: 2024-06-24

## 2024-06-24 ENCOUNTER — APPOINTMENT (OUTPATIENT)
Dept: INTERNAL MEDICINE | Facility: CLINIC | Age: 69
End: 2024-06-24

## 2024-06-24 PROCEDURE — 92012 INTRM OPH EXAM EST PATIENT: CPT

## 2024-06-26 ENCOUNTER — APPOINTMENT (OUTPATIENT)
Dept: INTERNAL MEDICINE | Facility: CLINIC | Age: 69
End: 2024-06-26

## 2024-07-19 DIAGNOSIS — E11.9 TYPE 2 DIABETES MELLITUS W/OUT COMPLICATIONS: ICD-10-CM

## 2024-07-19 RX ORDER — BLOOD-GLUCOSE METER
KIT MISCELLANEOUS 3 TIMES DAILY
Qty: 100 | Refills: 4 | Status: ACTIVE | COMMUNITY
Start: 2024-07-19 | End: 1900-01-01

## 2024-07-19 RX ORDER — LANCETS 33 GAUGE
EACH MISCELLANEOUS
Qty: 100 | Refills: 6 | Status: ACTIVE | COMMUNITY
Start: 2024-07-19 | End: 1900-01-01

## 2024-08-13 ENCOUNTER — APPOINTMENT (OUTPATIENT)
Dept: OPHTHALMOLOGY | Facility: CLINIC | Age: 69
End: 2024-08-13
Payer: MEDICARE

## 2024-08-13 ENCOUNTER — NON-APPOINTMENT (OUTPATIENT)
Age: 69
End: 2024-08-13

## 2024-08-13 PROCEDURE — 92136 OPHTHALMIC BIOMETRY: CPT

## 2024-08-13 PROCEDURE — 92025 CPTRIZED CORNEAL TOPOGRAPHY: CPT

## 2024-08-13 PROCEDURE — 92014 COMPRE OPH EXAM EST PT 1/>: CPT | Mod: 25

## 2024-11-01 ENCOUNTER — NON-APPOINTMENT (OUTPATIENT)
Age: 69
End: 2024-11-01

## 2024-11-01 ENCOUNTER — APPOINTMENT (OUTPATIENT)
Dept: INTERNAL MEDICINE | Facility: CLINIC | Age: 69
End: 2024-11-01

## 2024-11-01 VITALS
OXYGEN SATURATION: 97 % | HEART RATE: 75 BPM | HEIGHT: 64 IN | WEIGHT: 130 LBS | BODY MASS INDEX: 22.2 KG/M2 | SYSTOLIC BLOOD PRESSURE: 112 MMHG | DIASTOLIC BLOOD PRESSURE: 78 MMHG

## 2024-11-01 DIAGNOSIS — H26.9 UNSPECIFIED CATARACT: ICD-10-CM

## 2024-11-01 DIAGNOSIS — Z00.8 ENCOUNTER FOR OTHER GENERAL EXAMINATION: ICD-10-CM

## 2024-11-01 DIAGNOSIS — E11.9 TYPE 2 DIABETES MELLITUS W/OUT COMPLICATIONS: ICD-10-CM

## 2024-11-01 LAB — HBA1C MFR BLD HPLC: 8.1

## 2024-11-01 PROCEDURE — 99214 OFFICE O/P EST MOD 30 MIN: CPT | Mod: 25

## 2024-11-01 PROCEDURE — 93010 ELECTROCARDIOGRAM REPORT: CPT

## 2024-11-01 RX ORDER — SITAGLIPTIN AND METFORMIN HYDROCHLORIDE 50; 1000 MG/1; MG/1
50-1000 TABLET, FILM COATED ORAL TWICE DAILY
Qty: 60 | Refills: 3 | Status: ACTIVE | COMMUNITY
Start: 2024-11-01 | End: 1900-01-01

## 2024-11-05 ENCOUNTER — APPOINTMENT (OUTPATIENT)
Dept: OPHTHALMOLOGY | Facility: AMBULATORY SURGERY CENTER | Age: 69
End: 2024-11-05

## 2024-11-05 PROCEDURE — 66984 XCAPSL CTRC RMVL W/O ECP: CPT | Mod: LT

## 2024-11-06 ENCOUNTER — NON-APPOINTMENT (OUTPATIENT)
Age: 69
End: 2024-11-06

## 2024-11-06 ENCOUNTER — APPOINTMENT (OUTPATIENT)
Dept: OPHTHALMOLOGY | Facility: CLINIC | Age: 69
End: 2024-11-06
Payer: MEDICARE

## 2024-11-06 PROCEDURE — 99024 POSTOP FOLLOW-UP VISIT: CPT

## 2024-11-06 NOTE — PHYSICAL EXAM
How Severe Are Your Spot(S)?: mild [Normal Breath Sounds] : Normal breath sounds [Normal Rate and Rhythm] : normal rate and rhythm [No Rash or Lesion] : No rash or lesion [Alert] : alert [Abdominal Masses] : No abdominal masses [de-identified] : NAD [de-identified] : Abd soft, non distended, non tender. Bilateral inguinal hernia present on cough exam.

## 2024-11-12 ENCOUNTER — APPOINTMENT (OUTPATIENT)
Dept: OPHTHALMOLOGY | Facility: CLINIC | Age: 69
End: 2024-11-12
Payer: MEDICARE

## 2024-11-12 ENCOUNTER — NON-APPOINTMENT (OUTPATIENT)
Age: 69
End: 2024-11-12

## 2024-11-12 PROCEDURE — 92134 CPTRZ OPH DX IMG PST SGM RTA: CPT

## 2024-11-12 PROCEDURE — 99024 POSTOP FOLLOW-UP VISIT: CPT

## 2024-11-20 ENCOUNTER — APPOINTMENT (OUTPATIENT)
Dept: INTERNAL MEDICINE | Facility: CLINIC | Age: 69
End: 2024-11-20

## 2024-12-10 ENCOUNTER — APPOINTMENT (OUTPATIENT)
Dept: OPHTHALMOLOGY | Facility: CLINIC | Age: 69
End: 2024-12-10
Payer: MEDICARE

## 2024-12-10 ENCOUNTER — NON-APPOINTMENT (OUTPATIENT)
Age: 69
End: 2024-12-10

## 2024-12-10 PROCEDURE — 99024 POSTOP FOLLOW-UP VISIT: CPT

## 2024-12-10 PROCEDURE — 92134 CPTRZ OPH DX IMG PST SGM RTA: CPT

## 2024-12-26 ENCOUNTER — RX RENEWAL (OUTPATIENT)
Age: 69
End: 2024-12-26

## 2025-06-17 ENCOUNTER — APPOINTMENT (OUTPATIENT)
Dept: OPHTHALMOLOGY | Facility: CLINIC | Age: 70
End: 2025-06-17
Payer: MEDICARE

## 2025-06-17 ENCOUNTER — NON-APPOINTMENT (OUTPATIENT)
Age: 70
End: 2025-06-17

## 2025-06-17 PROCEDURE — 92012 INTRM OPH EXAM EST PATIENT: CPT | Mod: 25

## 2025-06-17 PROCEDURE — 95060 OPH MUCOUS MEMBRANE TESTS: CPT

## 2025-06-27 ENCOUNTER — APPOINTMENT (OUTPATIENT)
Dept: INTERNAL MEDICINE | Facility: CLINIC | Age: 70
End: 2025-06-27

## 2025-06-27 ENCOUNTER — OUTPATIENT (OUTPATIENT)
Dept: OUTPATIENT SERVICES | Facility: HOSPITAL | Age: 70
LOS: 1 days | End: 2025-06-27
Payer: COMMERCIAL

## 2025-06-27 VITALS
DIASTOLIC BLOOD PRESSURE: 72 MMHG | HEART RATE: 61 BPM | BODY MASS INDEX: 21.85 KG/M2 | SYSTOLIC BLOOD PRESSURE: 122 MMHG | OXYGEN SATURATION: 98 % | HEIGHT: 64 IN | WEIGHT: 128 LBS

## 2025-06-27 DIAGNOSIS — I10 ESSENTIAL (PRIMARY) HYPERTENSION: ICD-10-CM

## 2025-06-27 PROBLEM — R09.89 BIBASILAR CRACKLES: Status: ACTIVE | Noted: 2025-06-27

## 2025-06-27 PROCEDURE — G0439: CPT

## 2025-06-30 LAB
ALBUMIN SERPL ELPH-MCNC: 4.8 G/DL
ALP BLD-CCNC: 73 U/L
ALT SERPL-CCNC: 42 U/L
ANION GAP SERPL CALC-SCNC: 14 MMOL/L
AST SERPL-CCNC: 28 U/L
BASOPHILS # BLD AUTO: 0.04 K/UL
BASOPHILS NFR BLD AUTO: 0.5 %
BILIRUB SERPL-MCNC: 1.3 MG/DL
BUN SERPL-MCNC: 12 MG/DL
CALCIUM SERPL-MCNC: 10 MG/DL
CHLORIDE SERPL-SCNC: 102 MMOL/L
CHOLEST SERPL-MCNC: 205 MG/DL
CO2 SERPL-SCNC: 22 MMOL/L
CREAT SERPL-MCNC: 0.99 MG/DL
CREAT SPEC-SCNC: 76 MG/DL
CREAT/PROT UR: 0.1 RATIO
EGFRCR SERPLBLD CKD-EPI 2021: 82 ML/MIN/1.73M2
EOSINOPHIL # BLD AUTO: 0.2 K/UL
EOSINOPHIL NFR BLD AUTO: 2.7 %
ESTIMATED AVERAGE GLUCOSE: 148 MG/DL
FOLATE SERPL-MCNC: 16.2 NG/ML
GLUCOSE SERPL-MCNC: 116 MG/DL
HBA1C MFR BLD HPLC: 6.8 %
HCT VFR BLD CALC: 46 %
HCV AB SER QL: NONREACTIVE
HCV S/CO RATIO: 0.1 S/CO
HDLC SERPL-MCNC: 48 MG/DL
HGB BLD-MCNC: 15.5 G/DL
HIV1+2 AB SPEC QL IA.RAPID: NONREACTIVE
IMM GRANULOCYTES NFR BLD AUTO: 0.5 %
LDLC SERPL-MCNC: 130 MG/DL
LYMPHOCYTES # BLD AUTO: 1.82 K/UL
LYMPHOCYTES NFR BLD AUTO: 24.9 %
MAN DIFF?: NORMAL
MCHC RBC-ENTMCNC: 29.5 PG
MCHC RBC-ENTMCNC: 33.7 G/DL
MCV RBC AUTO: 87.5 FL
MONOCYTES # BLD AUTO: 0.9 K/UL
MONOCYTES NFR BLD AUTO: 12.3 %
NEUTROPHILS # BLD AUTO: 4.3 K/UL
NEUTROPHILS NFR BLD AUTO: 59.1 %
NONHDLC SERPL-MCNC: 157 MG/DL
PLATELET # BLD AUTO: 244 K/UL
POTASSIUM SERPL-SCNC: 4.7 MMOL/L
PROT SERPL-MCNC: 7.6 G/DL
PROT UR-MCNC: 5 MG/DL
PSA SERPL-MCNC: 1.64 NG/ML
RBC # BLD: 5.26 M/UL
RBC # FLD: 13.3 %
SODIUM SERPL-SCNC: 138 MMOL/L
TRIGL SERPL-MCNC: 151 MG/DL
TSH SERPL-ACNC: 2.74 UIU/ML
VIT B12 SERPL-MCNC: 1117 PG/ML
WBC # FLD AUTO: 7.3 K/UL

## 2025-07-02 ENCOUNTER — OUTPATIENT (OUTPATIENT)
Dept: OUTPATIENT SERVICES | Facility: HOSPITAL | Age: 70
LOS: 1 days | End: 2025-07-02
Payer: COMMERCIAL

## 2025-07-02 ENCOUNTER — APPOINTMENT (OUTPATIENT)
Dept: RADIOLOGY | Facility: CLINIC | Age: 70
End: 2025-07-02
Payer: MEDICARE

## 2025-07-02 ENCOUNTER — APPOINTMENT (OUTPATIENT)
Dept: ULTRASOUND IMAGING | Facility: CLINIC | Age: 70
End: 2025-07-02
Payer: MEDICARE

## 2025-07-02 DIAGNOSIS — K76.0 FATTY (CHANGE OF) LIVER, NOT ELSEWHERE CLASSIFIED: ICD-10-CM

## 2025-07-02 DIAGNOSIS — R09.89 OTHER SPECIFIED SYMPTOMS AND SIGNS INVOLVING THE CIRCULATORY AND RESPIRATORY SYSTEMS: ICD-10-CM

## 2025-07-02 PROCEDURE — 71046 X-RAY EXAM CHEST 2 VIEWS: CPT | Mod: 26

## 2025-07-02 PROCEDURE — 76705 ECHO EXAM OF ABDOMEN: CPT | Mod: 26

## 2025-07-02 PROCEDURE — 76705 ECHO EXAM OF ABDOMEN: CPT

## 2025-07-02 PROCEDURE — 71046 X-RAY EXAM CHEST 2 VIEWS: CPT

## 2025-07-08 DIAGNOSIS — Z12.11 ENCOUNTER FOR SCREENING FOR MALIGNANT NEOPLASM OF COLON: ICD-10-CM

## 2025-07-08 DIAGNOSIS — Z00.00 ENCOUNTER FOR GENERAL ADULT MEDICAL EXAMINATION WITHOUT ABNORMAL FINDINGS: ICD-10-CM

## 2025-07-08 DIAGNOSIS — E11.9 TYPE 2 DIABETES MELLITUS WITHOUT COMPLICATIONS: ICD-10-CM

## 2025-07-08 DIAGNOSIS — R09.89 OTHER SPECIFIED SYMPTOMS AND SIGNS INVOLVING THE CIRCULATORY AND RESPIRATORY SYSTEMS: ICD-10-CM

## 2025-07-08 DIAGNOSIS — E78.5 HYPERLIPIDEMIA, UNSPECIFIED: ICD-10-CM

## 2025-07-28 ENCOUNTER — OUTPATIENT (OUTPATIENT)
Dept: OUTPATIENT SERVICES | Facility: HOSPITAL | Age: 70
LOS: 1 days | End: 2025-07-28
Payer: COMMERCIAL

## 2025-07-28 ENCOUNTER — APPOINTMENT (OUTPATIENT)
Dept: INTERNAL MEDICINE | Facility: CLINIC | Age: 70
End: 2025-07-28
Payer: MEDICARE

## 2025-07-28 VITALS
SYSTOLIC BLOOD PRESSURE: 126 MMHG | OXYGEN SATURATION: 98 % | DIASTOLIC BLOOD PRESSURE: 68 MMHG | HEIGHT: 64 IN | HEART RATE: 69 BPM | BODY MASS INDEX: 22.02 KG/M2 | WEIGHT: 129 LBS

## 2025-07-28 DIAGNOSIS — E78.5 HYPERLIPIDEMIA, UNSPECIFIED: ICD-10-CM

## 2025-07-28 DIAGNOSIS — E11.9 TYPE 2 DIABETES MELLITUS W/OUT COMPLICATIONS: ICD-10-CM

## 2025-07-28 DIAGNOSIS — K76.0 FATTY (CHANGE OF) LIVER, NOT ELSEWHERE CLASSIFIED: ICD-10-CM

## 2025-07-28 DIAGNOSIS — I10 ESSENTIAL (PRIMARY) HYPERTENSION: ICD-10-CM

## 2025-07-28 PROCEDURE — G0463: CPT

## 2025-07-28 PROCEDURE — 99214 OFFICE O/P EST MOD 30 MIN: CPT

## 2025-07-28 PROCEDURE — G0538: CPT

## 2025-07-28 PROCEDURE — G2211 COMPLEX E/M VISIT ADD ON: CPT

## 2025-07-28 RX ORDER — ATORVASTATIN CALCIUM 40 MG/1
40 TABLET, FILM COATED ORAL
Qty: 90 | Refills: 3 | Status: ACTIVE | COMMUNITY
Start: 2025-07-28 | End: 1900-01-01

## 2025-08-05 DIAGNOSIS — E11.9 TYPE 2 DIABETES MELLITUS WITHOUT COMPLICATIONS: ICD-10-CM

## 2025-08-05 DIAGNOSIS — E78.5 HYPERLIPIDEMIA, UNSPECIFIED: ICD-10-CM

## 2025-08-05 DIAGNOSIS — K76.0 FATTY (CHANGE OF) LIVER, NOT ELSEWHERE CLASSIFIED: ICD-10-CM
